# Patient Record
Sex: FEMALE | Race: BLACK OR AFRICAN AMERICAN | NOT HISPANIC OR LATINO | Employment: OTHER | URBAN - METROPOLITAN AREA
[De-identification: names, ages, dates, MRNs, and addresses within clinical notes are randomized per-mention and may not be internally consistent; named-entity substitution may affect disease eponyms.]

---

## 2018-04-19 ENCOUNTER — OFFICE VISIT (OUTPATIENT)
Dept: CARDIOLOGY | Facility: CLINIC | Age: 58
End: 2018-04-19
Payer: MEDICARE

## 2018-04-19 VITALS
WEIGHT: 293 LBS | DIASTOLIC BLOOD PRESSURE: 92 MMHG | OXYGEN SATURATION: 97 % | HEART RATE: 92 BPM | SYSTOLIC BLOOD PRESSURE: 152 MMHG | HEIGHT: 65 IN | BODY MASS INDEX: 48.82 KG/M2

## 2018-04-19 DIAGNOSIS — E66.9 DIABETES MELLITUS TYPE 2 IN OBESE: ICD-10-CM

## 2018-04-19 DIAGNOSIS — I10 ESSENTIAL HYPERTENSION: ICD-10-CM

## 2018-04-19 DIAGNOSIS — I63.40 CEREBROVASCULAR ACCIDENT (CVA) DUE TO EMBOLISM OF CEREBRAL ARTERY: Primary | ICD-10-CM

## 2018-04-19 DIAGNOSIS — E11.69 DIABETES MELLITUS TYPE 2 IN OBESE: ICD-10-CM

## 2018-04-19 DIAGNOSIS — I48.0 PAROXYSMAL ATRIAL FIBRILLATION: ICD-10-CM

## 2018-04-19 DIAGNOSIS — Z91.89 AT HIGH RISK FOR GU BLEEDING: ICD-10-CM

## 2018-04-19 PROBLEM — E78.00 PURE HYPERCHOLESTEROLEMIA: Status: ACTIVE | Noted: 2017-09-26

## 2018-04-19 PROBLEM — I63.9 CEREBROVASCULAR ACCIDENT (CVA) DUE TO EMBOLISM: Status: ACTIVE | Noted: 2017-09-26

## 2018-04-19 PROCEDURE — 99205 OFFICE O/P NEW HI 60 MIN: CPT | Mod: S$GLB,,, | Performed by: INTERNAL MEDICINE

## 2018-04-19 PROCEDURE — 93000 ELECTROCARDIOGRAM COMPLETE: CPT | Mod: S$GLB,,, | Performed by: NUCLEAR MEDICINE

## 2018-04-19 PROCEDURE — 3080F DIAST BP >= 90 MM HG: CPT | Mod: CPTII,S$GLB,, | Performed by: INTERNAL MEDICINE

## 2018-04-19 PROCEDURE — 3077F SYST BP >= 140 MM HG: CPT | Mod: CPTII,S$GLB,, | Performed by: INTERNAL MEDICINE

## 2018-04-19 PROCEDURE — 99999 PR PBB SHADOW E&M-EST. PATIENT-LVL III: CPT | Mod: PBBFAC,,, | Performed by: INTERNAL MEDICINE

## 2018-04-19 RX ORDER — METOPROLOL TARTRATE 50 MG/1
50 TABLET ORAL 2 TIMES DAILY
Status: ON HOLD | COMMUNITY
End: 2018-06-11

## 2018-04-19 RX ORDER — INSULIN GLARGINE 100 [IU]/ML
48 INJECTION, SOLUTION SUBCUTANEOUS NIGHTLY
COMMUNITY

## 2018-04-19 RX ORDER — METFORMIN HYDROCHLORIDE 500 MG/1
500 TABLET ORAL 2 TIMES DAILY
COMMUNITY
Start: 2017-09-26

## 2018-04-19 RX ORDER — PRAVASTATIN SODIUM 40 MG/1
40 TABLET ORAL DAILY
Status: ON HOLD | COMMUNITY
End: 2018-06-11 | Stop reason: HOSPADM

## 2018-04-19 RX ORDER — HYDRALAZINE HYDROCHLORIDE 25 MG/1
50 TABLET, FILM COATED ORAL 2 TIMES DAILY
Status: ON HOLD | COMMUNITY
End: 2018-06-11 | Stop reason: HOSPADM

## 2018-04-19 RX ORDER — LUBIPROSTONE 8 UG/1
8 CAPSULE ORAL 2 TIMES DAILY PRN
COMMUNITY

## 2018-04-19 RX ORDER — PANTOPRAZOLE SODIUM 40 MG/1
40 TABLET, DELAYED RELEASE ORAL DAILY
COMMUNITY

## 2018-04-19 RX ORDER — IBUPROFEN 600 MG/1
600 TABLET ORAL EVERY 6 HOURS PRN
COMMUNITY

## 2018-04-19 RX ORDER — SPIRONOLACTONE 50 MG/1
50 TABLET, FILM COATED ORAL 2 TIMES DAILY
COMMUNITY

## 2018-04-19 NOTE — PROGRESS NOTES
Subjective:    Patient ID:  Sara Grey is a 57 y.o. female who presents for evaluation of Atrial Fibrillation      57 yoF HTN, DM, AF, CVA, COPD, tob abuse here for LAAO consideration. She has history of AF dating back several years. She was placed on pradaxa for CVA prophylaxis. She suffered a CVA 6/17 on pradaxa leading to some speech impairment that is mostly improved. She was switched to eliquis but has had recurrent  bleeding. Her EF is normal at 55% but she has diastolic dynsfunction. She has moderate COPD, LUCRECIA and morbid obesity. No history of PVD.    EF 55% 6/25/17    Past Medical History:  No date: Atrial fibrillation  No date: COPD (chronic obstructive pulmonary disease)  No date: CVA (cerebral vascular accident)  No date: Hypertension    No past surgical history on file.    Social History    Marital status: Single              Spouse name:                       Years of education:                 Number of children:               Occupational History    None on file    Social History Main Topics    Smoking status: Current Every Day Smoker                                                     Packs/day: 0.00      Years: 0.00           Smokeless tobacco: Not on file                       Alcohol use: Not on file     Drug use: Not on file     Sexual activity: Not on file          Other Topics            Concern    None on file    Social History Narrative    None on file    No family history on file.          Review of Systems   Constitution: Negative.   HENT: Negative.    Eyes: Negative.    Cardiovascular: Positive for dyspnea on exertion. Negative for chest pain, leg swelling, near-syncope, palpitations and syncope.   Respiratory: Positive for shortness of breath.    Endocrine: Negative.    Hematologic/Lymphatic: Negative.    Skin: Negative.    Musculoskeletal: Negative.    Gastrointestinal: Positive for bloating.   Genitourinary: Positive for non-menstrual bleeding.   Neurological: Negative.  Negative  for dizziness and light-headedness.   Psychiatric/Behavioral: Negative.    Allergic/Immunologic: Negative.         Objective:    Physical Exam   Constitutional: She is oriented to person, place, and time. She appears well-developed and well-nourished. No distress.   Morbidly obese   HENT:   Head: Normocephalic and atraumatic.   Eyes: EOM are normal. Pupils are equal, round, and reactive to light.   Neck: Normal range of motion. No JVD present. No thyromegaly present.   Cardiovascular: Normal rate, S1 normal, S2 normal and normal heart sounds.  An irregularly irregular rhythm present. PMI is not displaced.  Exam reveals no gallop and no friction rub.    No murmur heard.  Pulmonary/Chest: Effort normal and breath sounds normal. No respiratory distress. She has no wheezes. She has no rales.   Abdominal: Soft. Bowel sounds are normal. She exhibits mass (ventral hernia). She exhibits no distension. There is no tenderness. There is no rebound and no guarding.   Musculoskeletal: Normal range of motion. She exhibits no edema or tenderness.   Neurological: She is alert and oriented to person, place, and time. No cranial nerve deficit.   Skin: Skin is warm and dry. No rash noted. No erythema.   Psychiatric: She has a normal mood and affect. Her behavior is normal. Judgment and thought content normal.   Vitals reviewed.    ECG: AF with variable V rate, nl QRS        Assessment:       1. Cerebrovascular accident (CVA) due to embolism of cerebral artery    2. Paroxysmal atrial fibrillation    3. At high risk for  bleeding    4. Essential hypertension    5. Diabetes mellitus type 2 in obese         Plan:       57 yoF paroxysmal (possibly persistent) AF, HTN, DM, CVA here for LAAO consideration. She has had a CVA while on on pradaxa and has had  bleeding on eliquis. She has a CHADSVASC score of 5. She can tolerate short term OAC but is not a candidate for long term OAC due to recurrent bleeding issues. I discussed management  options regarding LAAO. I discussed the process of LAAO via Watchman including pre-procedure testing- SADIE & CT- as well as the need to take warfarin for 6 weeks post implant. We discussed post procedure SADIE studies to assess XOCHILT occlusion. Additionally I mentioned the need to take DAPT up to the 6 month point post implant.     Pre-procedure Watchman studies: SADIE & CT for XOCHILT geometry  Watchman with Anesthesia- Choice, may need airway due to obesity/COPD

## 2018-04-25 ENCOUNTER — CLINICAL SUPPORT (OUTPATIENT)
Dept: CARDIOLOGY | Facility: CLINIC | Age: 58
End: 2018-04-25
Attending: INTERNAL MEDICINE
Payer: MEDICARE

## 2018-04-25 ENCOUNTER — LAB VISIT (OUTPATIENT)
Dept: LAB | Facility: HOSPITAL | Age: 58
End: 2018-04-25
Attending: INTERNAL MEDICINE
Payer: MEDICARE

## 2018-04-25 ENCOUNTER — DOCUMENTATION ONLY (OUTPATIENT)
Dept: CARDIOLOGY | Facility: CLINIC | Age: 58
End: 2018-04-25

## 2018-04-25 DIAGNOSIS — I48.0 PAROXYSMAL ATRIAL FIBRILLATION: ICD-10-CM

## 2018-04-25 LAB
ALBUMIN SERPL BCP-MCNC: 3.2 G/DL
ALP SERPL-CCNC: 105 U/L
ALT SERPL W/O P-5'-P-CCNC: 28 U/L
ANION GAP SERPL CALC-SCNC: 8 MMOL/L
AST SERPL-CCNC: 22 U/L
BASOPHILS # BLD AUTO: 0.04 K/UL
BASOPHILS NFR BLD: 0.4 %
BILIRUB SERPL-MCNC: 0.6 MG/DL
BUN SERPL-MCNC: 20 MG/DL
CALCIUM SERPL-MCNC: 9 MG/DL
CHLORIDE SERPL-SCNC: 112 MMOL/L
CO2 SERPL-SCNC: 23 MMOL/L
CREAT SERPL-MCNC: 1.2 MG/DL
DIASTOLIC DYSFUNCTION: NO
DIFFERENTIAL METHOD: ABNORMAL
EOSINOPHIL # BLD AUTO: 0.2 K/UL
EOSINOPHIL NFR BLD: 1.7 %
ERYTHROCYTE [DISTWIDTH] IN BLOOD BY AUTOMATED COUNT: 15.9 %
EST. GFR  (AFRICAN AMERICAN): 58.8 ML/MIN/1.73 M^2
EST. GFR  (NON AFRICAN AMERICAN): 51 ML/MIN/1.73 M^2
ESTIMATED PA SYSTOLIC PRESSURE: 19.89
GLUCOSE SERPL-MCNC: 127 MG/DL
HCT VFR BLD AUTO: 40.5 %
HGB BLD-MCNC: 12.3 G/DL
IMM GRANULOCYTES # BLD AUTO: 0.04 K/UL
IMM GRANULOCYTES NFR BLD AUTO: 0.4 %
LYMPHOCYTES # BLD AUTO: 1.3 K/UL
LYMPHOCYTES NFR BLD: 12.3 %
MCH RBC QN AUTO: 28.5 PG
MCHC RBC AUTO-ENTMCNC: 30.4 G/DL
MCV RBC AUTO: 94 FL
MONOCYTES # BLD AUTO: 0.6 K/UL
MONOCYTES NFR BLD: 5.6 %
NEUTROPHILS # BLD AUTO: 8.2 K/UL
NEUTROPHILS NFR BLD: 79.6 %
NRBC BLD-RTO: 0 /100 WBC
PLATELET # BLD AUTO: 313 K/UL
PMV BLD AUTO: 10.9 FL
POTASSIUM SERPL-SCNC: 4.4 MMOL/L
PROT SERPL-MCNC: 7.2 G/DL
RBC # BLD AUTO: 4.32 M/UL
RETIRED EF AND QEF - SEE NOTES: 55 (ref 55–65)
SODIUM SERPL-SCNC: 143 MMOL/L
WBC # BLD AUTO: 10.33 K/UL

## 2018-04-25 PROCEDURE — 80053 COMPREHEN METABOLIC PANEL: CPT

## 2018-04-25 PROCEDURE — 93306 TTE W/DOPPLER COMPLETE: CPT | Mod: S$GLB,,, | Performed by: INTERNAL MEDICINE

## 2018-04-25 PROCEDURE — 85025 COMPLETE CBC W/AUTO DIFF WBC: CPT

## 2018-04-25 PROCEDURE — 36415 COLL VENOUS BLD VENIPUNCTURE: CPT

## 2018-04-25 NOTE — PROGRESS NOTES
Patient presented for an echocardiogram today.  This study was performed in conjunction with Optison contrast agent because of poor endocardial visualization.  Procedure was explained to the patient, she verbalized understanding and signed the consent.  IV started in the right AC using aseptic technique by Rhonda Rosales LPN x 1 attempt. Optison lot # [id number] (expiration date 08/21/2019) was used.  Patient tolerated the procedure well.

## 2018-05-11 ENCOUNTER — TELEPHONE (OUTPATIENT)
Dept: ELECTROPHYSIOLOGY | Facility: CLINIC | Age: 58
End: 2018-05-11

## 2018-05-11 DIAGNOSIS — I48.0 PAROXYSMAL ATRIAL FIBRILLATION: Primary | ICD-10-CM

## 2018-05-11 NOTE — TELEPHONE ENCOUNTER
CT SCAN & TRANSESOPHAGEAL ECHO (SADIE) INSTRUCTIONS    6/8/18 @ 9 AM -- CT SCAN of chest   [facility] (across the street from hospital)  [address], [place], [place]    · Arrive 30 min prior to procedure.    6/8/18 at 11 AM  Report to the Cardiology Waiting Area on the 3rd floor of the hospital at (Do not report to clinic)  If you park in the Parking Garage:  Take elevators to the 2nd floor  Walk up ramp and turn right by Gold Elevators  Take elevator to the 3rd floor  Upon exiting the elevator, turn away from the clinic areas  Walk long ibanez around to front of hospital to area with windows overlooking [facility]  Check in at Reception Desk  OR  If family is dropping you off:  Have them drop you off at the front of the Hospital  (Near the ER, where all the flags are hung).  Take the E elevators to the 3rd floor.  Check in at the Reception Desk in the waiting room.    Do not eat or drink anything after 12 mn the night before your procedure    Medications:  Stop your Metformin 1 day before your CT and SADIE  Take half your normal dose of your Lantus on the evening before your procedure at bedtime  Hold your Metformin and any insulin on the morning of your procedure  Do not take your Metformin for 2 days after your CT scan  You may take your usual morning medications with a sip of water    A nurse from the Echo Dept will call you to ask you some questions before your procedure.    You will need someone to drive you home after your test.     Any need to reschedule or cancel procedures, or any questions regarding your procedures should be addressed directly with the Arrhythmia Department Nurses at the following phone number: [phone]

## 2018-06-08 ENCOUNTER — HOSPITAL ENCOUNTER (OUTPATIENT)
Dept: RADIOLOGY | Facility: HOSPITAL | Age: 58
Discharge: HOME | DRG: 308 | End: 2018-06-08
Attending: INTERNAL MEDICINE
Payer: MEDICARE

## 2018-06-08 ENCOUNTER — HOSPITAL ENCOUNTER (INPATIENT)
Facility: HOSPITAL | Age: 58
LOS: 3 days | Discharge: HOME HEALTH | DRG: 308 | End: 2018-06-11
Attending: INTERNAL MEDICINE | Admitting: INTERNAL MEDICINE
Payer: MEDICARE

## 2018-06-08 ENCOUNTER — HOSPITAL ENCOUNTER (OUTPATIENT)
Dept: CARDIOLOGY | Facility: CLINIC | Age: 58
Discharge: HOME | DRG: 308 | End: 2018-06-08
Attending: INTERNAL MEDICINE | Admitting: INTERNAL MEDICINE
Payer: MEDICARE

## 2018-06-08 ENCOUNTER — ANESTHESIA EVENT (OUTPATIENT)
Dept: MEDSURG UNIT | Facility: HOSPITAL | Age: 58
DRG: 308 | End: 2018-06-08
Payer: MEDICARE

## 2018-06-08 ENCOUNTER — ANESTHESIA (OUTPATIENT)
Dept: MEDSURG UNIT | Facility: HOSPITAL | Age: 58
DRG: 308 | End: 2018-06-08
Payer: MEDICARE

## 2018-06-08 ENCOUNTER — SURGERY (OUTPATIENT)
Age: 58
End: 2018-06-08

## 2018-06-08 DIAGNOSIS — I48.0 PAROXYSMAL ATRIAL FIBRILLATION: ICD-10-CM

## 2018-06-08 DIAGNOSIS — Z91.89 AT HIGH RISK FOR GU BLEEDING: ICD-10-CM

## 2018-06-08 DIAGNOSIS — E11.69 DIABETES MELLITUS TYPE 2 IN OBESE: ICD-10-CM

## 2018-06-08 DIAGNOSIS — I50.33 ACUTE ON CHRONIC DIASTOLIC (CONGESTIVE) HEART FAILURE: ICD-10-CM

## 2018-06-08 DIAGNOSIS — I63.9 CEREBROVASCULAR ACCIDENT (CVA) DUE TO EMBOLISM: ICD-10-CM

## 2018-06-08 DIAGNOSIS — I48.91 AF (ATRIAL FIBRILLATION): ICD-10-CM

## 2018-06-08 DIAGNOSIS — I48.19 PERSISTENT ATRIAL FIBRILLATION: ICD-10-CM

## 2018-06-08 DIAGNOSIS — J44.9 CHRONIC OBSTRUCTIVE PULMONARY DISEASE, UNSPECIFIED COPD TYPE: ICD-10-CM

## 2018-06-08 DIAGNOSIS — I48.91 ATRIAL FIBRILLATION WITH RAPID VENTRICULAR RESPONSE: ICD-10-CM

## 2018-06-08 DIAGNOSIS — R06.83 SNORING: ICD-10-CM

## 2018-06-08 DIAGNOSIS — I48.0 PAROXYSMAL ATRIAL FIBRILLATION: Primary | ICD-10-CM

## 2018-06-08 DIAGNOSIS — I10 ESSENTIAL HYPERTENSION: ICD-10-CM

## 2018-06-08 DIAGNOSIS — E78.00 PURE HYPERCHOLESTEROLEMIA: ICD-10-CM

## 2018-06-08 DIAGNOSIS — E66.9 DIABETES MELLITUS TYPE 2 IN OBESE: ICD-10-CM

## 2018-06-08 DIAGNOSIS — R09.02 HYPOXEMIA: ICD-10-CM

## 2018-06-08 DIAGNOSIS — J44.9 COPD (CHRONIC OBSTRUCTIVE PULMONARY DISEASE): ICD-10-CM

## 2018-06-08 DIAGNOSIS — I63.40 CEREBROVASCULAR ACCIDENT (CVA) DUE TO EMBOLISM OF CEREBRAL ARTERY: ICD-10-CM

## 2018-06-08 LAB
ALBUMIN SERPL BCP-MCNC: 3.2 G/DL
ALP SERPL-CCNC: 99 U/L
ALT SERPL W/O P-5'-P-CCNC: 12 U/L
ANION GAP SERPL CALC-SCNC: 12 MMOL/L
AST SERPL-CCNC: 11 U/L
BILIRUB SERPL-MCNC: 0.8 MG/DL
BNP SERPL-MCNC: 331 PG/ML
BUN SERPL-MCNC: 16 MG/DL
CALCIUM SERPL-MCNC: 8.8 MG/DL
CHLORIDE SERPL-SCNC: 107 MMOL/L
CO2 SERPL-SCNC: 25 MMOL/L
CREAT SERPL-MCNC: 1 MG/DL (ref 0.5–1.4)
CREAT SERPL-MCNC: 1.1 MG/DL
ERYTHROCYTE [DISTWIDTH] IN BLOOD BY AUTOMATED COUNT: 15.3 %
EST. GFR  (AFRICAN AMERICAN): >60 ML/MIN/1.73 M^2
EST. GFR  (NON AFRICAN AMERICAN): 56.7 ML/MIN/1.73 M^2
ESTIMATED AVG GLUCOSE: 186 MG/DL
GLUCOSE SERPL-MCNC: 145 MG/DL
HBA1C MFR BLD HPLC: 8.1 %
HCT VFR BLD AUTO: 41.8 %
HGB BLD-MCNC: 13.1 G/DL
INR PPP: 1.1
MAGNESIUM SERPL-MCNC: 1.9 MG/DL
MCH RBC QN AUTO: 27.3 PG
MCHC RBC AUTO-ENTMCNC: 31.3 G/DL
MCV RBC AUTO: 87 FL
PLATELET # BLD AUTO: 246 K/UL
PMV BLD AUTO: 11.9 FL
POCT GLUCOSE: 205 MG/DL (ref 70–110)
POTASSIUM SERPL-SCNC: 3.8 MMOL/L
PROT SERPL-MCNC: 6.8 G/DL
PROTHROMBIN TIME: 11.2 SEC
RBC # BLD AUTO: 4.79 M/UL
SAMPLE: NORMAL
SODIUM SERPL-SCNC: 144 MMOL/L
WBC # BLD AUTO: 9.92 K/UL

## 2018-06-08 PROCEDURE — 25000003 PHARM REV CODE 250: Performed by: STUDENT IN AN ORGANIZED HEALTH CARE EDUCATION/TRAINING PROGRAM

## 2018-06-08 PROCEDURE — 93010 ELECTROCARDIOGRAM REPORT: CPT | Mod: ,,, | Performed by: INTERNAL MEDICINE

## 2018-06-08 PROCEDURE — 25000003 PHARM REV CODE 250: Performed by: INTERNAL MEDICINE

## 2018-06-08 PROCEDURE — 85027 COMPLETE CBC AUTOMATED: CPT

## 2018-06-08 PROCEDURE — 99222 1ST HOSP IP/OBS MODERATE 55: CPT | Mod: AI,GC,, | Performed by: INTERNAL MEDICINE

## 2018-06-08 PROCEDURE — 27000190 HC CPAP FULL FACE MASK W/VALVE

## 2018-06-08 PROCEDURE — 93005 ELECTROCARDIOGRAM TRACING: CPT

## 2018-06-08 PROCEDURE — 63600175 PHARM REV CODE 636 W HCPCS: Performed by: NURSE ANESTHETIST, CERTIFIED REGISTERED

## 2018-06-08 PROCEDURE — 80053 COMPREHEN METABOLIC PANEL: CPT

## 2018-06-08 PROCEDURE — 25000003 PHARM REV CODE 250: Performed by: NURSE ANESTHETIST, CERTIFIED REGISTERED

## 2018-06-08 PROCEDURE — D9220A PRA ANESTHESIA: Mod: ANES,,, | Performed by: ANESTHESIOLOGY

## 2018-06-08 PROCEDURE — 83036 HEMOGLOBIN GLYCOSYLATED A1C: CPT

## 2018-06-08 PROCEDURE — B246ZZ4 ULTRASONOGRAPHY OF RIGHT AND LEFT HEART, TRANSESOPHAGEAL: ICD-10-PCS | Performed by: ANESTHESIOLOGY

## 2018-06-08 PROCEDURE — 36415 COLL VENOUS BLD VENIPUNCTURE: CPT

## 2018-06-08 PROCEDURE — D9220A PRA ANESTHESIA: Mod: CRNA,,, | Performed by: NURSE ANESTHETIST, CERTIFIED REGISTERED

## 2018-06-08 PROCEDURE — 85610 PROTHROMBIN TIME: CPT

## 2018-06-08 PROCEDURE — 83735 ASSAY OF MAGNESIUM: CPT

## 2018-06-08 PROCEDURE — 25000242 PHARM REV CODE 250 ALT 637 W/ HCPCS: Performed by: NURSE ANESTHETIST, CERTIFIED REGISTERED

## 2018-06-08 PROCEDURE — 27000221 HC OXYGEN, UP TO 24 HOURS

## 2018-06-08 PROCEDURE — 37000009 HC ANESTHESIA EA ADD 15 MINS

## 2018-06-08 PROCEDURE — 63600175 PHARM REV CODE 636 W HCPCS: Performed by: INTERNAL MEDICINE

## 2018-06-08 PROCEDURE — 94761 N-INVAS EAR/PLS OXIMETRY MLT: CPT

## 2018-06-08 PROCEDURE — 20000000 HC ICU ROOM

## 2018-06-08 PROCEDURE — 94660 CPAP INITIATION&MGMT: CPT

## 2018-06-08 PROCEDURE — 37000008 HC ANESTHESIA 1ST 15 MINUTES

## 2018-06-08 PROCEDURE — 99900035 HC TECH TIME PER 15 MIN (STAT)

## 2018-06-08 PROCEDURE — 83880 ASSAY OF NATRIURETIC PEPTIDE: CPT

## 2018-06-08 RX ORDER — SODIUM CHLORIDE 0.9 % (FLUSH) 0.9 %
5 SYRINGE (ML) INJECTION
Status: DISCONTINUED | OUTPATIENT
Start: 2018-06-08 | End: 2018-06-11 | Stop reason: HOSPADM

## 2018-06-08 RX ORDER — ATORVASTATIN CALCIUM 20 MG/1
80 TABLET, FILM COATED ORAL DAILY
Status: DISCONTINUED | OUTPATIENT
Start: 2018-06-09 | End: 2018-06-08

## 2018-06-08 RX ORDER — MIDAZOLAM HYDROCHLORIDE 1 MG/ML
INJECTION, SOLUTION INTRAMUSCULAR; INTRAVENOUS
Status: DISCONTINUED | OUTPATIENT
Start: 2018-06-08 | End: 2018-06-08

## 2018-06-08 RX ORDER — METOPROLOL TARTRATE 50 MG/1
50 TABLET ORAL 2 TIMES DAILY
Status: DISCONTINUED | OUTPATIENT
Start: 2018-06-08 | End: 2018-06-08

## 2018-06-08 RX ORDER — DILTIAZEM HYDROCHLORIDE 30 MG/1
30 TABLET, FILM COATED ORAL EVERY 6 HOURS
Status: DISCONTINUED | OUTPATIENT
Start: 2018-06-08 | End: 2018-06-10

## 2018-06-08 RX ORDER — LISINOPRIL 2.5 MG/1
2.5 TABLET ORAL DAILY
COMMUNITY
End: 2018-08-16 | Stop reason: SDUPTHER

## 2018-06-08 RX ORDER — LANOLIN ALCOHOL/MO/W.PET/CERES
400 CREAM (GRAM) TOPICAL ONCE
Status: COMPLETED | OUTPATIENT
Start: 2018-06-08 | End: 2018-06-08

## 2018-06-08 RX ORDER — ALBUTEROL SULFATE 90 UG/1
AEROSOL, METERED RESPIRATORY (INHALATION)
Status: DISCONTINUED | OUTPATIENT
Start: 2018-06-08 | End: 2018-06-08

## 2018-06-08 RX ORDER — LANOLIN ALCOHOL/MO/W.PET/CERES
800 CREAM (GRAM) TOPICAL
Status: DISCONTINUED | OUTPATIENT
Start: 2018-06-08 | End: 2018-06-11 | Stop reason: HOSPADM

## 2018-06-08 RX ORDER — KETAMINE HYDROCHLORIDE 10 MG/ML
INJECTION, SOLUTION INTRAMUSCULAR; INTRAVENOUS
Status: DISCONTINUED | OUTPATIENT
Start: 2018-06-08 | End: 2018-06-08

## 2018-06-08 RX ORDER — GLYCOPYRROLATE 0.2 MG/ML
INJECTION INTRAMUSCULAR; INTRAVENOUS
Status: DISCONTINUED | OUTPATIENT
Start: 2018-06-08 | End: 2018-06-08

## 2018-06-08 RX ORDER — DILTIAZEM HYDROCHLORIDE 5 MG/ML
INJECTION INTRAVENOUS
Status: DISCONTINUED | OUTPATIENT
Start: 2018-06-08 | End: 2018-06-08

## 2018-06-08 RX ORDER — SODIUM CHLORIDE 9 MG/ML
INJECTION, SOLUTION INTRAVENOUS ONCE
Status: COMPLETED | OUTPATIENT
Start: 2018-06-08 | End: 2018-06-08

## 2018-06-08 RX ORDER — ATORVASTATIN CALCIUM 80 MG/1
80 TABLET, FILM COATED ORAL DAILY
Status: ON HOLD | COMMUNITY
End: 2018-06-11 | Stop reason: HOSPADM

## 2018-06-08 RX ORDER — SODIUM,POTASSIUM PHOSPHATES 280-250MG
2 POWDER IN PACKET (EA) ORAL
Status: DISCONTINUED | OUTPATIENT
Start: 2018-06-08 | End: 2018-06-11 | Stop reason: HOSPADM

## 2018-06-08 RX ORDER — SODIUM CHLORIDE 0.9 % (FLUSH) 0.9 %
3 SYRINGE (ML) INJECTION EVERY 8 HOURS
Status: DISCONTINUED | OUTPATIENT
Start: 2018-06-08 | End: 2018-06-11 | Stop reason: HOSPADM

## 2018-06-08 RX ORDER — POTASSIUM CHLORIDE 20 MEQ/15ML
60 SOLUTION ORAL
Status: DISCONTINUED | OUTPATIENT
Start: 2018-06-08 | End: 2018-06-11 | Stop reason: HOSPADM

## 2018-06-08 RX ORDER — PRAVASTATIN SODIUM 40 MG/1
40 TABLET ORAL DAILY
Status: DISCONTINUED | OUTPATIENT
Start: 2018-06-09 | End: 2018-06-11 | Stop reason: HOSPADM

## 2018-06-08 RX ORDER — GLUCAGON 1 MG
1 KIT INJECTION
Status: DISCONTINUED | OUTPATIENT
Start: 2018-06-08 | End: 2018-06-11 | Stop reason: HOSPADM

## 2018-06-08 RX ORDER — METOPROLOL TARTRATE 50 MG/1
50 TABLET ORAL 3 TIMES DAILY
Status: DISCONTINUED | OUTPATIENT
Start: 2018-06-08 | End: 2018-06-08

## 2018-06-08 RX ORDER — DILTIAZEM HCL 1 MG/ML
5 INJECTION, SOLUTION INTRAVENOUS CONTINUOUS
Status: DISCONTINUED | OUTPATIENT
Start: 2018-06-08 | End: 2018-06-09

## 2018-06-08 RX ORDER — PANTOPRAZOLE SODIUM 40 MG/1
40 TABLET, DELAYED RELEASE ORAL DAILY
Status: DISCONTINUED | OUTPATIENT
Start: 2018-06-09 | End: 2018-06-11 | Stop reason: HOSPADM

## 2018-06-08 RX ORDER — POTASSIUM CHLORIDE 20 MEQ/15ML
40 SOLUTION ORAL
Status: DISCONTINUED | OUTPATIENT
Start: 2018-06-08 | End: 2018-06-11 | Stop reason: HOSPADM

## 2018-06-08 RX ORDER — LISINOPRIL 2.5 MG/1
2.5 TABLET ORAL DAILY
Status: DISCONTINUED | OUTPATIENT
Start: 2018-06-09 | End: 2018-06-11 | Stop reason: HOSPADM

## 2018-06-08 RX ORDER — POTASSIUM CHLORIDE 20 MEQ/1
40 TABLET, EXTENDED RELEASE ORAL ONCE
Status: COMPLETED | OUTPATIENT
Start: 2018-06-08 | End: 2018-06-08

## 2018-06-08 RX ORDER — FUROSEMIDE 40 MG/1
40 TABLET ORAL 2 TIMES DAILY
COMMUNITY

## 2018-06-08 RX ORDER — DILTIAZEM HYDROCHLORIDE 30 MG/1
30 TABLET, FILM COATED ORAL EVERY 6 HOURS
Status: DISCONTINUED | OUTPATIENT
Start: 2018-06-08 | End: 2018-06-08

## 2018-06-08 RX ORDER — HYDRALAZINE HYDROCHLORIDE 50 MG/1
50 TABLET, FILM COATED ORAL 2 TIMES DAILY
Status: DISCONTINUED | OUTPATIENT
Start: 2018-06-08 | End: 2018-06-11 | Stop reason: HOSPADM

## 2018-06-08 RX ORDER — ISOSORBIDE MONONITRATE 30 MG/1
30 TABLET, EXTENDED RELEASE ORAL DAILY
Status: DISCONTINUED | OUTPATIENT
Start: 2018-06-09 | End: 2018-06-11 | Stop reason: HOSPADM

## 2018-06-08 RX ORDER — INSULIN ASPART 100 [IU]/ML
0-5 INJECTION, SOLUTION INTRAVENOUS; SUBCUTANEOUS EVERY 6 HOURS PRN
Status: DISCONTINUED | OUTPATIENT
Start: 2018-06-08 | End: 2018-06-10

## 2018-06-08 RX ORDER — DILTIAZEM HCL 1 MG/ML
5 INJECTION, SOLUTION INTRAVENOUS CONTINUOUS
Status: DISCONTINUED | OUTPATIENT
Start: 2018-06-08 | End: 2018-06-08

## 2018-06-08 RX ORDER — FUROSEMIDE 10 MG/ML
60 INJECTION INTRAMUSCULAR; INTRAVENOUS ONCE
Status: COMPLETED | OUTPATIENT
Start: 2018-06-08 | End: 2018-06-08

## 2018-06-08 RX ADMIN — APIXABAN 5 MG: 2.5 TABLET, FILM COATED ORAL at 08:06

## 2018-06-08 RX ADMIN — DILTIAZEM HYDROCHLORIDE 10000 MCG: 5 INJECTION, SOLUTION INTRAVENOUS at 02:06

## 2018-06-08 RX ADMIN — DILTIAZEM HYDROCHLORIDE 5 MG/HR: 5 INJECTION INTRAVENOUS at 02:06

## 2018-06-08 RX ADMIN — KETAMINE HYDROCHLORIDE 20 MG: 10 INJECTION, SOLUTION INTRAMUSCULAR; INTRAVENOUS at 01:06

## 2018-06-08 RX ADMIN — HYDRALAZINE HYDROCHLORIDE 50 MG: 50 TABLET ORAL at 08:06

## 2018-06-08 RX ADMIN — INSULIN ASPART 1 UNITS: 100 INJECTION, SOLUTION INTRAVENOUS; SUBCUTANEOUS at 09:06

## 2018-06-08 RX ADMIN — POTASSIUM CHLORIDE 40 MEQ: 1500 TABLET, EXTENDED RELEASE ORAL at 05:06

## 2018-06-08 RX ADMIN — MIDAZOLAM 1 MG: 1 INJECTION INTRAMUSCULAR; INTRAVENOUS at 01:06

## 2018-06-08 RX ADMIN — Medication 400 MG: at 05:06

## 2018-06-08 RX ADMIN — DILTIAZEM HYDROCHLORIDE 5 MG/HR: 5 INJECTION INTRAVENOUS at 08:06

## 2018-06-08 RX ADMIN — ALBUTEROL SULFATE 3 PUFF: 90 AEROSOL, METERED RESPIRATORY (INHALATION) at 01:06

## 2018-06-08 RX ADMIN — DILTIAZEM HYDROCHLORIDE 30 MG: 30 TABLET, FILM COATED ORAL at 11:06

## 2018-06-08 RX ADMIN — FUROSEMIDE 60 MG: 10 INJECTION, SOLUTION INTRAMUSCULAR; INTRAVENOUS at 05:06

## 2018-06-08 RX ADMIN — GLYCOPYRROLATE 0.2 MG: 0.2 INJECTION, SOLUTION INTRAMUSCULAR; INTRAVENOUS at 01:06

## 2018-06-08 RX ADMIN — SODIUM CHLORIDE: 0.9 INJECTION, SOLUTION INTRAVENOUS at 01:06

## 2018-06-08 RX ADMIN — DILTIAZEM HYDROCHLORIDE 30 MG: 30 TABLET, FILM COATED ORAL at 05:06

## 2018-06-08 RX ADMIN — ALBUTEROL SULFATE 2 PUFF: 90 AEROSOL, METERED RESPIRATORY (INHALATION) at 01:06

## 2018-06-08 NOTE — SUBJECTIVE & OBJECTIVE
Review of Systems   Constitution: Negative for chills and fever.   HENT: Negative for ear discharge.    Eyes: Negative for pain and visual disturbance.   Cardiovascular: Positive for chest pain and irregular heartbeat. Negative for dyspnea on exertion, leg swelling, orthopnea, palpitations, paroxysmal nocturnal dyspnea and syncope.   Respiratory: Positive for shortness of breath. Negative for hemoptysis and wheezing.    Skin: Negative for rash and suspicious lesions.   Musculoskeletal: Negative for joint pain and muscle weakness.   Gastrointestinal: Negative for abdominal pain, diarrhea, hematemesis, hematochezia, melena, nausea and vomiting.   Genitourinary: Negative for dysuria and frequency.   Neurological: Negative for focal weakness, headaches and tremors.   Psychiatric/Behavioral: Negative for altered mental status, suicidal ideas and thoughts of violence.     Objective:     Vital Signs (Most Recent):  Temp: 97.2 °F (36.2 °C) (06/08/18 1220)  Pulse: 102 (06/08/18 1220)  Resp: 18 (06/08/18 1220)  BP: 136/87 (06/08/18 1222)  SpO2: (!) 94 % (06/08/18 1220) Vital Signs (24h Range):  Temp:  [97.2 °F (36.2 °C)] 97.2 °F (36.2 °C)  Pulse:  [102] 102  Resp:  [18] 18  SpO2:  [94 %] 94 %  BP: (136-141)/(87-98) 136/87     Weight: (!) 158.8 kg (350 lb)  Body mass index is 62 kg/m².     SpO2: (!) 94 %  O2 Device (Oxygen Therapy): room air      Intake/Output Summary (Last 24 hours) at 06/08/18 1527  Last data filed at 06/08/18 1500   Gross per 24 hour   Intake              100 ml   Output                0 ml   Net              100 ml       Lines/Drains/Airways     Peripheral Intravenous Line                 Peripheral IV - Single Lumen 06/08/18 0834 Right Antecubital less than 1 day                Physical Exam   Constitutional: She is oriented to person, place, and time. She appears well-developed and well-nourished.   HENT:   Head: Normocephalic and atraumatic.   Eyes: EOM are normal.   Cardiovascular: An irregularly  irregular rhythm present. Tachycardia present.  Exam reveals no gallop and no friction rub.    2+ b/l LE edema   Pulmonary/Chest: Effort normal and breath sounds normal. No stridor. She has no wheezes. She has no rales.   Abdominal: Soft. Bowel sounds are normal. There is no rebound and no guarding.   Musculoskeletal: She exhibits no edema.   Neurological: She is alert and oriented to person, place, and time. No cranial nerve deficit.   Skin: Skin is warm and dry.   Psychiatric: She has a normal mood and affect. Her behavior is normal.       Significant Labs: All pertinent lab results from the last 24 hours have been reviewed.

## 2018-06-08 NOTE — ASSESSMENT & PLAN NOTE
- patient reports that a sleep study is due  - snores at home and at risk for LUCRECIA  - CPAP tonight

## 2018-06-08 NOTE — H&P
"[facility]  Cardiology  History and Physical     Patient Name: Sara Grey  MRN: [id number]  Admission Date: 6/8/2018  Code Status: Full Code   Attending Provider: Ovi Ferraro MD   Primary Care Physician: Primary Doctor No  Principal Problem:<principal problem not specified>    Patient information was obtained from patient and past medical records.     Subjective:     Chief Complaint:  Tachycardia     HPI:  58 y/o female with PMH of HTN, DM, AF on Eliquis (several years of AF;  bleeding on NOAC), CVA (6/17 while on Pradaxa with residual speech impairment), COPD, LUCRECIA, and tobacco abuse presenting as an outpatient to Dr. Ferraro for XOCHILT occlusion device considation. The patient was to get a SADIE today, and staff noted stable AF with RVR with HRs in the 180-200s. Her planned CTA was cancelled. Diltiazem gtt was started, and the patient was transferred to the CCU for further care. The patient denies palpitations but felt "chest tightness and a little fluttering" when her HR was high in the lab. She reports SOB before it resolved when the diltiazem gtt was started. She denies a h/o AAD use. Her HR is currently in the 120s with SBP 110s with SpO2 96% on 4L O2. The patient snores at night.    TTE 4/2018    1 - Concentric hypertrophy.     2 - No wall motion abnormalities.     3 - Normal left ventricular systolic function (EF 55-60%).     4 - Normal left ventricular diastolic function.     5 - Low normal to mildly depressed right ventricular systolic function .     6 - The estimated PA systolic pressure is greater than 20 mmHg.     Review of Systems   Constitution: Negative for chills and fever.   HENT: Negative for ear discharge.    Eyes: Negative for pain and visual disturbance.   Cardiovascular: Positive for chest pain and irregular heartbeat. Negative for dyspnea on exertion, leg swelling, orthopnea, palpitations, paroxysmal nocturnal dyspnea and syncope.   Respiratory: Positive for " shortness of breath. Negative for hemoptysis and wheezing.    Skin: Negative for rash and suspicious lesions.   Musculoskeletal: Negative for joint pain and muscle weakness.   Gastrointestinal: Negative for abdominal pain, diarrhea, hematemesis, hematochezia, melena, nausea and vomiting.   Genitourinary: Negative for dysuria and frequency.   Neurological: Negative for focal weakness, headaches and tremors.   Psychiatric/Behavioral: Negative for altered mental status, suicidal ideas and thoughts of violence.     Objective:     Vital Signs (Most Recent):  Temp: 97.2 °F (36.2 °C) (06/08/18 1220)  Pulse: 102 (06/08/18 1220)  Resp: 18 (06/08/18 1220)  BP: 136/87 (06/08/18 1222)  SpO2: (!) 94 % (06/08/18 1220) Vital Signs (24h Range):  Temp:  [97.2 °F (36.2 °C)] 97.2 °F (36.2 °C)  Pulse:  [102] 102  Resp:  [18] 18  SpO2:  [94 %] 94 %  BP: (136-141)/(87-98) 136/87     Weight: (!) 158.8 kg (350 lb)  Body mass index is 62 kg/m².     SpO2: (!) 94 %  O2 Device (Oxygen Therapy): room air      Intake/Output Summary (Last 24 hours) at 06/08/18 1527  Last data filed at 06/08/18 1500   Gross per 24 hour   Intake              100 ml   Output                0 ml   Net              100 ml       Lines/Drains/Airways     Peripheral Intravenous Line                 Peripheral IV - Single Lumen 06/08/18 0834 Right Antecubital less than 1 day                Physical Exam   Constitutional: She is oriented to person, place, and time. She appears well-developed and well-nourished.   HENT:   Head: Normocephalic and atraumatic.   Eyes: EOM are normal.   Cardiovascular: An irregularly irregular rhythm present. Tachycardia present.  Exam reveals no gallop and no friction rub.    2+ b/l LE edema   Pulmonary/Chest: Effort normal and breath sounds normal. No stridor. She has no wheezes. She has no rales.   Abdominal: Soft. Bowel sounds are normal. There is no rebound and no guarding.   Musculoskeletal: She exhibits no edema.   Neurological: She is  alert and oriented to person, place, and time. No cranial nerve deficit.   Skin: Skin is warm and dry.   Psychiatric: She has a normal mood and affect. Her behavior is normal.       Significant Labs: All pertinent lab results from the last 24 hours have been reviewed.      Assessment and Plan:     Snoring    - patient reports that a sleep study is due  - snores at home and at risk for LUCRECIA  - CPAP tonight        Acute on chronic diastolic (congestive) heart failure    - IVP Lasix 60 mg x1  - female external catheter b/c patient HR increases to 170s with just minimal exertion  - HTN management; rate control strategy for now for AF          Persistent atrial fibrillation    - discussed with Dr. Ferraro: rate control strategy at least until euvolemic  - postpone XOCHILT occlusion device w/u as an inpatient  - c/w diltiazem gtt and add PO diltiazem 30 mg q6h to wean gtt  - goal K>4 and Mg>2  - c/w NOAC        Pure hypercholesterolemia    - c/w statin        Essential hypertension    - c/w ACEi, hydralazine-ISMN  - start PO diltiazem in lieu of metoprolol tartrate        Diabetes mellitus type 2 in obese    - SSI            VTE Risk Mitigation         Ordered     apixaban tablet 5 mg  2 times daily      06/08/18 3217          Kiran Vences MD  Cardiology   [facility]

## 2018-06-08 NOTE — PLAN OF CARE
Prior to the scheduled SADIE this afternoon, patient was noted to be actively wheezing requiring 2 breathing treatments while in the SADIE procedure room. Her HR was elevated to the 200's, alternating between AFib with RVR and SVT. B/P remained stable, ranging from the 130's to 180's systolic. 10 mg Diltiazem IV Bolus + diltiazem gtt @ 5 mg/hr was initiated, however treatment was initially delayed due to wait time from pharmacy. Patient B/P remained stable, HR reduced to less than 150, and patient was admitted to the CCU service for further management. CCU fellow notified.    Signed:  Pasha Woodward MD  Cardiology Fellow - PGY5  Pager: [phone]  6/8/2018 2:45 PM

## 2018-06-08 NOTE — PLAN OF CARE
Problem: Patient Care Overview  Goal: Plan of Care Review  Outcome: Ongoing (interventions implemented as appropriate)  Diltiazem gtt. Plan is to continue to monitor overnight with anticipated transfer to floor tomorrow morning. VS and assessment per flow sheet, patient progressing towards goals as tolerated, plan of care reviewed with Sara Grey and family, all concerns addressed, will continue to monitor.      Problem: Cardiac Rhythm Management Device (Adult)  Intervention: Support/Optimize Psychosocial Response to Condition  Past Medical History:   Diagnosis Date    Atrial fibrillation     COPD (chronic obstructive pulmonary disease)     CVA (cerebral vascular accident)     Diabetes mellitus     Hypertension

## 2018-06-08 NOTE — H&P
TRANSESOPHAGEAL ECHOCARDIOGRAPHY   PRE-PROCEDURE NOTE    06/08/2018    HPI:     Sara Grey is a 57 y.o. woman with PMHx of HTN, DM, AF, CVA, COPD, tobacco abuse who presents to [facility] for elective ADRIENNE as part of LAAO evaluation.    She follows Dr. Ferraro. She has history of AF dating back several years. She was placed on pradaxa for CVA prophylaxis. She suffered a CVA 6/17 on pradaxa leading to some speech impairment that is mostly improved. She was switched to eliquis but has had recurrent  bleeding. Her EF is normal at 55% but she has diastolic dynsfunction. She has moderate COPD, LUCRECIA and morbid obesity. No history of PVD.    Dysphagia or odynophagia:  No  Liver Disease, esophageal disease, or known varices:  No  Upper GI Bleeding: No  Snoring:  Yes  Sleep Apnea:  Yes  Prior neck surgery or radiation:  No  History of anesthetic difficulties:  No  Family history of anesthetic difficulties:  No  Last oral intake:  12 hours ago  Able to move neck in all directions:  Yes      Meds:     Scheduled Meds:  PRN Meds:  Continuous Infusions:    Physical Exam:     Vitals:          Constitutional: NAD, conversant  HEENT:   Sclera anicteric, Uvula midline, EOMI, OP clear  Neck:               No JVD, moves to all direction without any limitations  CV:               RRR, no murmurs / rubs / gallops, normal S1/S2  Pulm:               CTAB, no wheezes, rales, or ronchi  GI:               Abdomen soft, NTND, +BS  Extremities:              No LE edema, warm with palpable pulses  Neuro:   AAOX3, no focal motor deficits    Mallampati: 3  ASA: 3      Labs:     No results found for this or any previous visit (from the past 336 hour(s)).    No results found for this or any previous visit (from the past 336 hour(s)).    CrCl cannot be calculated (Patient's most recent lab result is older than the maximum 7 days allowed.).    INR: N/A. On Eliquis.    Imaging:  TTE (4/25/18):  CONCLUSIONS     1 - Concentric hypertrophy.     2 - No  wall motion abnormalities.     3 - Normal left ventricular systolic function (EF 55-60%).     4 - Normal left ventricular diastolic function.     5 - Low normal to mildly depressed right ventricular systolic function .     6 - The estimated PA systolic pressure is greater than 20 mmHg.     [id number]  pending          Assessment & Plan:     PLAN:  1. SADIE for evaluation of XOCHILT anatomy to assess suitability for LAAO device    -The risks, benefits & alternatives of the procedure were explained to the patient.    -The risks of transesophageal echo include but are not limited to:  Dental trauma, esophageal trauma/perforation, bleeding, laryngospasm/brochospasm, aspiration, sore throat/hoarseness, & dislodgement of the endotracheal tube/nasogastric tube (where applicable).    -The risks of moderate sedation include hypotension, respiratory depression, arrhythmias, bronchospasm, & death.    -Informed consent was obtained & the patient is agreeable to proceed with the procedure.    I will discuss with the attending physician. Attending addendum is to follow.      Attending addendum to follow     Nisreen Stanford MD  Cardiology Fellow  Pager: [phone]  2018 11:40 AM

## 2018-06-08 NOTE — HPI
"58 y/o female with PMH of HTN, DM, AF on Eliquis (several years of AF;  bleeding on NOAC), CVA (6/17 while on Pradaxa with residual speech impairment), COPD, LUCRECIA, and tobacco abuse presenting as an outpatient to Dr. Ferraro for XOCHILT occlusion device considation. The patient was to get a SADIE today, and staff noted stable AF with RVR with HRs in the 180-200s. Her planned CTA was cancelled. Diltiazem gtt was started, and the patient was transferred to the CCU for further care. The patient denies palpitations but felt "chest tightness and a little fluttering" when her HR was high in the lab. She reports SOB before it resolved when the diltiazem gtt was started. She denies a h/o AAD use. Her HR is currently in the 120s with SBP 110s with SpO2 96% on 4L O2. The patient snores at night.    TTE 4/2018    1 - Concentric hypertrophy.     2 - No wall motion abnormalities.     3 - Normal left ventricular systolic function (EF 55-60%).     4 - Normal left ventricular diastolic function.     5 - Low normal to mildly depressed right ventricular systolic function .     6 - The estimated PA systolic pressure is greater than 20 mmHg.   "

## 2018-06-08 NOTE — TRANSFER OF CARE
"Anesthesia Transfer of Care Note    Patient: Sara Grey    Procedure(s) Performed: Procedure(s) (LRB):  TRANSESOPHAGEAL ECHOCARDIOGRAM (SADIE) (N/A)    Patient location: ICU    Anesthesia Type: other: not performed    Transport from OR: Continuous ECG monitoring in transport. Continuos invasive BP monitoring in transport. Transported from OR on 2-3 L/min O2 by NC with adequate spontaneous ventilation. Continuous SpO2 monitoring in transport    Post pain: adequate analgesia    Post assessment: no apparent anesthetic complications    Post vital signs: stable    Level of consciousness: awake, alert and oriented    Nausea/Vomiting: no nausea/vomiting    Complications: none    Transfer of care protocol was followed      Last vitals:   Visit Vitals  /87 (BP Location: Right arm, Patient Position: Lying)   Pulse 102   Temp 36.2 °C (97.2 °F) (Oral)   Resp 18   Ht 5' 3" (1.6 m)   Wt (!) 158.8 kg (350 lb)   SpO2 (!) 94%   Breastfeeding? No   BMI 62.00 kg/m²     "

## 2018-06-08 NOTE — NURSING TRANSFER
Nursing Transfer Note      6/8/2018     Transfer To: 3078    Transfer via stretcher    Transfer with 4L to O2, cardiac monitoring    Transported by EP RN's    Medicines sent: diltiazem gtt    Chart send with patient: Yes    Notified: estephanie    Patient reassessed at: Jaclyn RN, present at bedside for handoff and assessment. PENG, ROOPA.    Upon arrival to floor: cardiac monitor applied, patient oriented to room, call bell in reach and bed in lowest position

## 2018-06-08 NOTE — ASSESSMENT & PLAN NOTE
- IVP Lasix 60 mg x1  - female external catheter b/c patient HR increases to 170s with just minimal exertion  - HTN management; rate control strategy for now for AF

## 2018-06-08 NOTE — ANESTHESIA PREPROCEDURE EVALUATION
06/08/2018  Sara Grey is a 57 y.o., female.    Anesthesia Evaluation         Review of Systems      Physical Exam  General:  Obesity    Airway/Jaw/Neck:  Airway Findings: Mouth Opening: Normal Tongue: Normal  General Airway Assessment: Adult  Mallampati: II  Improves to II with phonation.  TM Distance: Normal, at least 6 cm      Dental:  Dental Findings: In tact   Chest/Lungs:  Chest/Lungs Findings: Expiratory Wheezes, Mod., Normal Respiratory Rate     Heart/Vascular:  Heart Findings: Rate: Normal  Rhythm: Irregularly Irregular        Mental Status:  Mental Status Findings:  Cooperative, Alert and Oriented         Anesthesia Plan  Type of Anesthesia, risks & benefits discussed:  Anesthesia Type:  MAC  Patient's Preference:   Intra-op Monitoring Plan: standard ASA monitors  Intra-op Monitoring Plan Comments:   Post Op Pain Control Plan: multimodal analgesia  Post Op Pain Control Plan Comments:   Induction:   IV  Beta Blocker:  Patient is on a Beta-Blocker and has received one dose within the past 24 hours (No further documentation required).       Informed Consent: Patient understands risks and agrees with Anesthesia plan.  Questions answered. Anesthesia consent signed with patient.  ASA Score: 3     Day of Surgery Review of History & Physical:    H&P update referred to the surgeon.     Anesthesia Plan Notes: Espiratory wheezes bilaterally, and SaO2 < .90 on room air. Given sympathomimetic and parasympatholysis inhalers with improvemt in subjective and objective findings. Service notified of events.        Ready For Surgery From Anesthesia Perspective.

## 2018-06-08 NOTE — ASSESSMENT & PLAN NOTE
- discussed with Dr. Ferraro: rate control strategy at least until euvolemic  - postpone XOCHILT occlusion device w/u as an inpatient  - c/w diltiazem gtt and add PO diltiazem 30 mg q6h to wean gtt  - goal K>4 and Mg>2  - c/w NOAC

## 2018-06-09 PROBLEM — J44.9 COPD (CHRONIC OBSTRUCTIVE PULMONARY DISEASE): Status: ACTIVE | Noted: 2018-06-09

## 2018-06-09 PROBLEM — I48.91 ATRIAL FIBRILLATION WITH RAPID VENTRICULAR RESPONSE: Status: ACTIVE | Noted: 2018-06-09

## 2018-06-09 LAB
ALBUMIN SERPL BCP-MCNC: 3.2 G/DL
ALP SERPL-CCNC: 99 U/L
ALT SERPL W/O P-5'-P-CCNC: 13 U/L
ANION GAP SERPL CALC-SCNC: 9 MMOL/L
AST SERPL-CCNC: 12 U/L
BASOPHILS # BLD AUTO: 0.03 K/UL
BASOPHILS NFR BLD: 0.3 %
BILIRUB SERPL-MCNC: 0.6 MG/DL
BUN SERPL-MCNC: 19 MG/DL
CALCIUM SERPL-MCNC: 9 MG/DL
CHLORIDE SERPL-SCNC: 105 MMOL/L
CO2 SERPL-SCNC: 27 MMOL/L
CREAT SERPL-MCNC: 1.3 MG/DL
DIFFERENTIAL METHOD: ABNORMAL
EOSINOPHIL # BLD AUTO: 0.2 K/UL
EOSINOPHIL NFR BLD: 1.4 %
ERYTHROCYTE [DISTWIDTH] IN BLOOD BY AUTOMATED COUNT: 15.4 %
EST. GFR  (AFRICAN AMERICAN): 53.4 ML/MIN/1.73 M^2
EST. GFR  (NON AFRICAN AMERICAN): 46.3 ML/MIN/1.73 M^2
GLUCOSE SERPL-MCNC: 199 MG/DL
HCT VFR BLD AUTO: 40.7 %
HGB BLD-MCNC: 12.6 G/DL
IMM GRANULOCYTES # BLD AUTO: 0.04 K/UL
IMM GRANULOCYTES NFR BLD AUTO: 0.3 %
LYMPHOCYTES # BLD AUTO: 1.3 K/UL
LYMPHOCYTES NFR BLD: 11.1 %
MAGNESIUM SERPL-MCNC: 2.1 MG/DL
MCH RBC QN AUTO: 27.5 PG
MCHC RBC AUTO-ENTMCNC: 31 G/DL
MCV RBC AUTO: 89 FL
MONOCYTES # BLD AUTO: 0.8 K/UL
MONOCYTES NFR BLD: 6.8 %
NEUTROPHILS # BLD AUTO: 9.4 K/UL
NEUTROPHILS NFR BLD: 80.1 %
NRBC BLD-RTO: 0 /100 WBC
PHOSPHATE SERPL-MCNC: 4.2 MG/DL
PLATELET # BLD AUTO: 287 K/UL
PMV BLD AUTO: 10.8 FL
POCT GLUCOSE: 140 MG/DL (ref 70–110)
POCT GLUCOSE: 142 MG/DL (ref 70–110)
POCT GLUCOSE: 162 MG/DL (ref 70–110)
POCT GLUCOSE: 208 MG/DL (ref 70–110)
POTASSIUM SERPL-SCNC: 4.3 MMOL/L
PROT SERPL-MCNC: 6.9 G/DL
RBC # BLD AUTO: 4.58 M/UL
SODIUM SERPL-SCNC: 141 MMOL/L
WBC # BLD AUTO: 11.7 K/UL

## 2018-06-09 PROCEDURE — 84100 ASSAY OF PHOSPHORUS: CPT

## 2018-06-09 PROCEDURE — 25000003 PHARM REV CODE 250: Performed by: INTERNAL MEDICINE

## 2018-06-09 PROCEDURE — 83735 ASSAY OF MAGNESIUM: CPT

## 2018-06-09 PROCEDURE — 80053 COMPREHEN METABOLIC PANEL: CPT

## 2018-06-09 PROCEDURE — 20000000 HC ICU ROOM

## 2018-06-09 PROCEDURE — 85025 COMPLETE CBC W/AUTO DIFF WBC: CPT

## 2018-06-09 PROCEDURE — 94761 N-INVAS EAR/PLS OXIMETRY MLT: CPT

## 2018-06-09 PROCEDURE — 94660 CPAP INITIATION&MGMT: CPT

## 2018-06-09 PROCEDURE — 99222 1ST HOSP IP/OBS MODERATE 55: CPT | Mod: AI,GC,, | Performed by: INTERNAL MEDICINE

## 2018-06-09 PROCEDURE — 99900035 HC TECH TIME PER 15 MIN (STAT)

## 2018-06-09 PROCEDURE — 27000221 HC OXYGEN, UP TO 24 HOURS

## 2018-06-09 PROCEDURE — 63600175 PHARM REV CODE 636 W HCPCS: Performed by: INTERNAL MEDICINE

## 2018-06-09 RX ORDER — FUROSEMIDE 10 MG/ML
80 INJECTION INTRAMUSCULAR; INTRAVENOUS 2 TIMES DAILY
Status: DISCONTINUED | OUTPATIENT
Start: 2018-06-09 | End: 2018-06-11

## 2018-06-09 RX ADMIN — DILTIAZEM HYDROCHLORIDE 30 MG: 30 TABLET, FILM COATED ORAL at 05:06

## 2018-06-09 RX ADMIN — APIXABAN 5 MG: 2.5 TABLET, FILM COATED ORAL at 08:06

## 2018-06-09 RX ADMIN — FUROSEMIDE 80 MG: 10 INJECTION, SOLUTION INTRAMUSCULAR; INTRAVENOUS at 05:06

## 2018-06-09 RX ADMIN — HYDRALAZINE HYDROCHLORIDE 50 MG: 50 TABLET ORAL at 09:06

## 2018-06-09 RX ADMIN — PANTOPRAZOLE SODIUM 40 MG: 40 TABLET, DELAYED RELEASE ORAL at 09:06

## 2018-06-09 RX ADMIN — ISOSORBIDE MONONITRATE 30 MG: 30 TABLET, EXTENDED RELEASE ORAL at 10:06

## 2018-06-09 RX ADMIN — INSULIN ASPART 1 UNITS: 100 INJECTION, SOLUTION INTRAVENOUS; SUBCUTANEOUS at 09:06

## 2018-06-09 RX ADMIN — PRAVASTATIN SODIUM 40 MG: 40 TABLET ORAL at 09:06

## 2018-06-09 RX ADMIN — LISINOPRIL 2.5 MG: 2.5 TABLET ORAL at 09:06

## 2018-06-09 RX ADMIN — HYDRALAZINE HYDROCHLORIDE 50 MG: 50 TABLET ORAL at 08:06

## 2018-06-09 RX ADMIN — APIXABAN 5 MG: 2.5 TABLET, FILM COATED ORAL at 09:06

## 2018-06-09 RX ADMIN — FUROSEMIDE 80 MG: 10 INJECTION, SOLUTION INTRAMUSCULAR; INTRAVENOUS at 09:06

## 2018-06-09 RX ADMIN — DILTIAZEM HYDROCHLORIDE 30 MG: 30 TABLET, FILM COATED ORAL at 12:06

## 2018-06-09 RX ADMIN — DILTIAZEM HYDROCHLORIDE 30 MG: 30 TABLET, FILM COATED ORAL at 11:06

## 2018-06-09 NOTE — ASSESSMENT & PLAN NOTE
- discussed with Dr. Ferraro yesterday: rate control strategy at least until euvolemic  - postpone XOCHILT occlusion device w/u as an inpatient  - c/w PO diltiazem 30 mg q6h   - goal K>4 and Mg>2  - c/w NOAC

## 2018-06-09 NOTE — ASSESSMENT & PLAN NOTE
- continues to have RVR and mild hypoxia with minimal exertion  - HTN management; rate control strategy for now for AF  - start IVP Lasix 80 mg BID  - PT/OT

## 2018-06-09 NOTE — ASSESSMENT & PLAN NOTE
- patient reports that a sleep study is due  - snores at home and at risk for LUCRECIA  - CPAP nightly at least while in house

## 2018-06-09 NOTE — SUBJECTIVE & OBJECTIVE
Interval History: The patient's diltiazem was stopped at 3A.    Review of Systems   Constitution: Negative for chills and fever.   HENT: Negative for ear discharge.    Eyes: Negative for pain and visual disturbance.   Cardiovascular: Negative for chest pain, dyspnea on exertion, irregular heartbeat, leg swelling, orthopnea, palpitations, paroxysmal nocturnal dyspnea and syncope.   Respiratory: Negative for hemoptysis, shortness of breath and wheezing.    Skin: Negative for rash and suspicious lesions.   Musculoskeletal: Negative for joint pain and muscle weakness.   Gastrointestinal: Negative for abdominal pain, diarrhea, hematemesis, hematochezia, melena, nausea and vomiting.   Genitourinary: Negative for dysuria and frequency.   Neurological: Negative for focal weakness, headaches and tremors.   Psychiatric/Behavioral: Negative for altered mental status, suicidal ideas and thoughts of violence.     Objective:     Vital Signs (Most Recent):  Temp: 98.2 °F (36.8 °C) (06/09/18 1500)  Pulse: 106 (06/09/18 1500)  Resp: 18 (06/09/18 1500)  BP: (!) 162/75 (06/09/18 1500)  SpO2: (!) 92 % (06/09/18 1500) Vital Signs (24h Range):  Temp:  [98.2 °F (36.8 °C)-98.8 °F (37.1 °C)] 98.2 °F (36.8 °C)  Pulse:  [] 106  Resp:  [] 18  SpO2:  [89 %-99 %] 92 %  BP: (120-173)/() 162/75     Weight: (!) 158.7 kg (349 lb 13.9 oz)  Body mass index is 61.98 kg/m².     SpO2: (!) 92 %  O2 Device (Oxygen Therapy): nasal cannula w/ humidification      Intake/Output Summary (Last 24 hours) at 06/09/18 1524  Last data filed at 06/09/18 1400   Gross per 24 hour   Intake           800.24 ml   Output             1600 ml   Net          -799.76 ml       Lines/Drains/Airways     Drain            Female External Urinary Catheter 06/08/18 1930 less than 1 day          Peripheral Intravenous Line                 Peripheral IV - Single Lumen 06/08/18 0834 Right Antecubital 1 day         Peripheral IV - Single Lumen 06/08/18 1700 Left  Antecubital less than 1 day                Physical Exam   Constitutional: She is oriented to person, place, and time. She appears well-developed and well-nourished.   HENT:   Head: Normocephalic and atraumatic.   Eyes: EOM are normal.   Cardiovascular: Normal rate.  An irregularly irregular rhythm present. Exam reveals no gallop and no friction rub.    Pulmonary/Chest: Effort normal and breath sounds normal. No stridor. She has no wheezes. She has no rales.   Abdominal: Soft. Bowel sounds are normal. There is no rebound and no guarding.   Musculoskeletal: She exhibits no edema.   Neurological: She is alert and oriented to person, place, and time. No cranial nerve deficit.   Skin: Skin is warm and dry.   Psychiatric: She has a normal mood and affect. Her behavior is normal.       Significant Labs: All pertinent lab results from the last 24 hours have been reviewed.

## 2018-06-09 NOTE — PROCEDURES
CENTRAL VENOUS LINE INSERTION:    Indications: vascular access - emergency placement; venous access obtained on first stick    Antisepsis: sterile gloves, mask, gown, and drape.  Skin prepped with chlorhexidine.  Catheter: Triple lumen via right femoral.  Insertion directed by anatomic landmarks.  Heme positive aspiration all ports.  Secured with sutures at skin.  Dressing: dry sterile gauze and bio occlusive dressing.  Complications: none.

## 2018-06-09 NOTE — PLAN OF CARE
Pt weaned off the cardizem drip during the night. Slept with CPAP. VSS with occasional hypertension that correlated with need for next cardizem PO or hydralazine. Sounds a little wheezy this morning and ambulated to the toilet without issue. Made plenty of measured and unmeasured urine output. Overall still had a good night. Remains Afib with a more controlled rate. Will continue to monitor the Pt.

## 2018-06-09 NOTE — PROCEDURES
CENTRAL VENOUS LINE INSERTION:    Indications: hemodialysis    Antisepsis: sterile gloves, mask, gown, and drape.  Skin prepped with chlorhexidine.  Catheter: Trialysis via right internal jugular.  Insertion directed by ultrasound and confirmation of venous placement completed with manometry/VBG.  Heme positive aspiration all ports.  Secured with sutures skin.  Dressing: dry sterile gauze and bio occlusive dressing.  Immediate complications: none. CXR ordered to confirm precise venous placement.

## 2018-06-09 NOTE — PROGRESS NOTES
[facility]  Cardiology  Progress Note    Patient Name: Sara Grey  MRN: [id number]  Admission Date: 6/8/2018  Hospital Length of Stay: 1 days  Code Status: Full Code   Attending Physician: Flora Mac MD   Primary Care Physician: Primary Doctor No  Expected Discharge Date:   Principal Problem:<principal problem not specified>    Subjective:     Interval History: The patient's diltiazem was stopped at 3A.    Review of Systems   Constitution: Negative for chills and fever.   HENT: Negative for ear discharge.    Eyes: Negative for pain and visual disturbance.   Cardiovascular: Negative for chest pain, dyspnea on exertion, irregular heartbeat, leg swelling, orthopnea, palpitations, paroxysmal nocturnal dyspnea and syncope.   Respiratory: Negative for hemoptysis, shortness of breath and wheezing.    Skin: Negative for rash and suspicious lesions.   Musculoskeletal: Negative for joint pain and muscle weakness.   Gastrointestinal: Negative for abdominal pain, diarrhea, hematemesis, hematochezia, melena, nausea and vomiting.   Genitourinary: Negative for dysuria and frequency.   Neurological: Negative for focal weakness, headaches and tremors.   Psychiatric/Behavioral: Negative for altered mental status, suicidal ideas and thoughts of violence.     Objective:     Vital Signs (Most Recent):  Temp: 98.2 °F (36.8 °C) (06/09/18 1500)  Pulse: 106 (06/09/18 1500)  Resp: 18 (06/09/18 1500)  BP: (!) 162/75 (06/09/18 1500)  SpO2: (!) 92 % (06/09/18 1500) Vital Signs (24h Range):  Temp:  [98.2 °F (36.8 °C)-98.8 °F (37.1 °C)] 98.2 °F (36.8 °C)  Pulse:  [] 106  Resp:  [] 18  SpO2:  [89 %-99 %] 92 %  BP: (120-173)/() 162/75     Weight: (!) 158.7 kg (349 lb 13.9 oz)  Body mass index is 61.98 kg/m².     SpO2: (!) 92 %  O2 Device (Oxygen Therapy): nasal cannula w/ humidification      Intake/Output Summary (Last 24 hours) at 06/09/18 1524  Last data filed at 06/09/18 1400   Gross per 24 hour    Intake           800.24 ml   Output             1600 ml   Net          -799.76 ml       Lines/Drains/Airways     Drain            Female External Urinary Catheter 06/08/18 1930 less than 1 day          Peripheral Intravenous Line                 Peripheral IV - Single Lumen 06/08/18 0834 Right Antecubital 1 day         Peripheral IV - Single Lumen 06/08/18 1700 Left Antecubital less than 1 day                Physical Exam   Constitutional: She is oriented to person, place, and time. She appears well-developed and well-nourished.   HENT:   Head: Normocephalic and atraumatic.   Eyes: EOM are normal.   Cardiovascular: Normal rate.  An irregularly irregular rhythm present. Exam reveals no gallop and no friction rub.    Pulmonary/Chest: Effort normal and breath sounds normal. No stridor. She has no wheezes. She has no rales.   Abdominal: Soft. Bowel sounds are normal. There is no rebound and no guarding.   Musculoskeletal: She exhibits no edema.   Neurological: She is alert and oriented to person, place, and time. No cranial nerve deficit.   Skin: Skin is warm and dry.   Psychiatric: She has a normal mood and affect. Her behavior is normal.       Significant Labs: All pertinent lab results from the last 24 hours have been reviewed.      Assessment and Plan:     Snoring    - patient reports that a sleep study is due  - snores at home and at risk for LUCRECIA  - CPAP nightly at least while in house        Acute on chronic diastolic (congestive) heart failure    - continues to have RVR and mild hypoxia with minimal exertion  - HTN management; rate control strategy for now for AF  - start IVP Lasix 80 mg BID  - PT/OT          Persistent atrial fibrillation    - discussed with Dr. Ferraro yesterday: rate control strategy at least until euvolemic  - postpone XOCHILT occlusion device w/u as an inpatient  - c/w PO diltiazem 30 mg q6h   - goal K>4 and Mg>2  - c/w NOAC        Pure hypercholesterolemia    - c/w statin        Essential  hypertension    - c/w ACEi, hydralazine-ISMN  - c/w PO diltiazem in lieu of metoprolol tartrate        Diabetes mellitus type 2 in obese    - SSI            VTE Risk Mitigation         Ordered     apixaban tablet 5 mg  2 times daily      06/08/18 0710          Kiran Vences MD  Cardiology  [facility]

## 2018-06-09 NOTE — PLAN OF CARE
Problem: Patient Care Overview  Goal: Plan of Care Review  Outcome: Ongoing (interventions implemented as appropriate)  No acute events throughout day, VS and assessment per flow sheet. PO Cardizem q6hr and 80mg IV lasix BID. BP stable and HR afib with rate <120. 4.5 liters of O2 NC during the day and CPAP at night. Purwick in place and UO >1 liter. One BM today. Patient able to ambulate to the bathroom with one person assist. Fiance at bedside. Plan of care reviewed with Sara Grey and family, all concerns addressed, will continue to monitor.

## 2018-06-10 PROBLEM — I48.91 AF (ATRIAL FIBRILLATION): Status: ACTIVE | Noted: 2018-06-10

## 2018-06-10 PROBLEM — I48.19 PERSISTENT ATRIAL FIBRILLATION: Status: RESOLVED | Noted: 2018-04-19 | Resolved: 2018-06-10

## 2018-06-10 LAB
ALBUMIN SERPL BCP-MCNC: 3.2 G/DL
ALP SERPL-CCNC: 97 U/L
ALT SERPL W/O P-5'-P-CCNC: 12 U/L
ANION GAP SERPL CALC-SCNC: 10 MMOL/L
AST SERPL-CCNC: 10 U/L
BASOPHILS # BLD AUTO: 0.02 K/UL
BASOPHILS NFR BLD: 0.2 %
BILIRUB SERPL-MCNC: 0.7 MG/DL
BUN SERPL-MCNC: 18 MG/DL
CALCIUM SERPL-MCNC: 9.1 MG/DL
CHLORIDE SERPL-SCNC: 103 MMOL/L
CO2 SERPL-SCNC: 29 MMOL/L
CREAT SERPL-MCNC: 1.2 MG/DL
DIFFERENTIAL METHOD: ABNORMAL
EOSINOPHIL # BLD AUTO: 0.2 K/UL
EOSINOPHIL NFR BLD: 1.7 %
ERYTHROCYTE [DISTWIDTH] IN BLOOD BY AUTOMATED COUNT: 15.2 %
EST. GFR  (AFRICAN AMERICAN): 58.8 ML/MIN/1.73 M^2
EST. GFR  (NON AFRICAN AMERICAN): 51 ML/MIN/1.73 M^2
GLUCOSE SERPL-MCNC: 168 MG/DL
HCT VFR BLD AUTO: 41.9 %
HGB BLD-MCNC: 13.1 G/DL
IMM GRANULOCYTES # BLD AUTO: 0.03 K/UL
IMM GRANULOCYTES NFR BLD AUTO: 0.3 %
LYMPHOCYTES # BLD AUTO: 1.2 K/UL
LYMPHOCYTES NFR BLD: 12.3 %
MAGNESIUM SERPL-MCNC: 1.8 MG/DL
MCH RBC QN AUTO: 27.6 PG
MCHC RBC AUTO-ENTMCNC: 31.3 G/DL
MCV RBC AUTO: 88 FL
MONOCYTES # BLD AUTO: 0.6 K/UL
MONOCYTES NFR BLD: 6.4 %
NEUTROPHILS # BLD AUTO: 7.7 K/UL
NEUTROPHILS NFR BLD: 79.1 %
NRBC BLD-RTO: 0 /100 WBC
PHOSPHATE SERPL-MCNC: 3.3 MG/DL
PLATELET # BLD AUTO: 289 K/UL
PMV BLD AUTO: 10.9 FL
POCT GLUCOSE: 131 MG/DL (ref 70–110)
POCT GLUCOSE: 183 MG/DL (ref 70–110)
POCT GLUCOSE: 184 MG/DL (ref 70–110)
POCT GLUCOSE: 231 MG/DL (ref 70–110)
POTASSIUM SERPL-SCNC: 3.9 MMOL/L
PROT SERPL-MCNC: 7 G/DL
RBC # BLD AUTO: 4.74 M/UL
SODIUM SERPL-SCNC: 142 MMOL/L
WBC # BLD AUTO: 9.73 K/UL

## 2018-06-10 PROCEDURE — 94761 N-INVAS EAR/PLS OXIMETRY MLT: CPT

## 2018-06-10 PROCEDURE — 84100 ASSAY OF PHOSPHORUS: CPT

## 2018-06-10 PROCEDURE — 85025 COMPLETE CBC W/AUTO DIFF WBC: CPT

## 2018-06-10 PROCEDURE — 63600175 PHARM REV CODE 636 W HCPCS: Performed by: INTERNAL MEDICINE

## 2018-06-10 PROCEDURE — 27000221 HC OXYGEN, UP TO 24 HOURS

## 2018-06-10 PROCEDURE — 80053 COMPREHEN METABOLIC PANEL: CPT

## 2018-06-10 PROCEDURE — 99900035 HC TECH TIME PER 15 MIN (STAT)

## 2018-06-10 PROCEDURE — 20000000 HC ICU ROOM

## 2018-06-10 PROCEDURE — 25000003 PHARM REV CODE 250: Performed by: INTERNAL MEDICINE

## 2018-06-10 PROCEDURE — 83735 ASSAY OF MAGNESIUM: CPT

## 2018-06-10 PROCEDURE — 25000003 PHARM REV CODE 250: Performed by: STUDENT IN AN ORGANIZED HEALTH CARE EDUCATION/TRAINING PROGRAM

## 2018-06-10 PROCEDURE — 94660 CPAP INITIATION&MGMT: CPT

## 2018-06-10 PROCEDURE — A4216 STERILE WATER/SALINE, 10 ML: HCPCS | Performed by: INTERNAL MEDICINE

## 2018-06-10 PROCEDURE — 99232 SBSQ HOSP IP/OBS MODERATE 35: CPT | Mod: ,,, | Performed by: INTERNAL MEDICINE

## 2018-06-10 RX ORDER — INSULIN ASPART 100 [IU]/ML
0-5 INJECTION, SOLUTION INTRAVENOUS; SUBCUTANEOUS
Status: DISCONTINUED | OUTPATIENT
Start: 2018-06-10 | End: 2018-06-11 | Stop reason: HOSPADM

## 2018-06-10 RX ORDER — METOPROLOL TARTRATE 50 MG/1
50 TABLET ORAL 3 TIMES DAILY
Status: DISCONTINUED | OUTPATIENT
Start: 2018-06-10 | End: 2018-06-11 | Stop reason: HOSPADM

## 2018-06-10 RX ADMIN — FUROSEMIDE 80 MG: 10 INJECTION, SOLUTION INTRAMUSCULAR; INTRAVENOUS at 07:06

## 2018-06-10 RX ADMIN — METOPROLOL TARTRATE 50 MG: 50 TABLET ORAL at 08:06

## 2018-06-10 RX ADMIN — INSULIN ASPART 2 UNITS: 100 INJECTION, SOLUTION INTRAVENOUS; SUBCUTANEOUS at 04:06

## 2018-06-10 RX ADMIN — DILTIAZEM HYDROCHLORIDE 30 MG: 30 TABLET, FILM COATED ORAL at 05:06

## 2018-06-10 RX ADMIN — Medication 800 MG: at 07:06

## 2018-06-10 RX ADMIN — ISOSORBIDE MONONITRATE 30 MG: 30 TABLET, EXTENDED RELEASE ORAL at 07:06

## 2018-06-10 RX ADMIN — HYDRALAZINE HYDROCHLORIDE 50 MG: 50 TABLET ORAL at 07:06

## 2018-06-10 RX ADMIN — HYDRALAZINE HYDROCHLORIDE 50 MG: 50 TABLET ORAL at 09:06

## 2018-06-10 RX ADMIN — PANTOPRAZOLE SODIUM 40 MG: 40 TABLET, DELAYED RELEASE ORAL at 07:06

## 2018-06-10 RX ADMIN — PRAVASTATIN SODIUM 40 MG: 40 TABLET ORAL at 07:06

## 2018-06-10 RX ADMIN — Medication 800 MG: at 05:06

## 2018-06-10 RX ADMIN — METOPROLOL TARTRATE 50 MG: 50 TABLET ORAL at 03:06

## 2018-06-10 RX ADMIN — FUROSEMIDE 80 MG: 10 INJECTION, SOLUTION INTRAMUSCULAR; INTRAVENOUS at 05:06

## 2018-06-10 RX ADMIN — APIXABAN 5 MG: 2.5 TABLET, FILM COATED ORAL at 08:06

## 2018-06-10 RX ADMIN — APIXABAN 5 MG: 2.5 TABLET, FILM COATED ORAL at 07:06

## 2018-06-10 RX ADMIN — LISINOPRIL 2.5 MG: 2.5 TABLET ORAL at 07:06

## 2018-06-10 RX ADMIN — SODIUM CHLORIDE, PRESERVATIVE FREE 3 ML: 5 INJECTION INTRAVENOUS at 05:06

## 2018-06-10 RX ADMIN — DILTIAZEM HYDROCHLORIDE 30 MG: 30 TABLET, FILM COATED ORAL at 11:06

## 2018-06-10 NOTE — SUBJECTIVE & OBJECTIVE
Interval History:No acute events overnight. Vitals within normal. Pt denies any palpitations, chest pain or SOB. She received relief from CPAP use overnight and agrees to pursue a sleep study test outpatient.     Review of Systems   Constitution: Negative for chills and fever.   HENT: Negative for ear discharge.    Eyes: Negative for pain and visual disturbance.   Cardiovascular: Negative for chest pain, dyspnea on exertion, irregular heartbeat, leg swelling, orthopnea, palpitations, paroxysmal nocturnal dyspnea and syncope.   Respiratory: Negative for hemoptysis, shortness of breath and wheezing.    Skin: Negative for rash and suspicious lesions.   Musculoskeletal: Negative for joint pain and muscle weakness.   Gastrointestinal: Negative for abdominal pain, diarrhea, hematemesis, hematochezia, melena, nausea and vomiting.   Genitourinary: Negative for dysuria and frequency.   Neurological: Negative for focal weakness, headaches and tremors.   Psychiatric/Behavioral: Negative for altered mental status, suicidal ideas and thoughts of violence.     Objective:     Vital Signs (Most Recent):  Temp: 98 °F (36.7 °C) (06/10/18 1100)  Pulse: 101 (06/10/18 1200)  Resp: 17 (06/10/18 1200)  BP: 116/69 (06/10/18 1200)  SpO2: 95 % (06/10/18 1200) Vital Signs (24h Range):  Temp:  [98 °F (36.7 °C)-98.5 °F (36.9 °C)] 98 °F (36.7 °C)  Pulse:  [] 101  Resp:  [11-29] 17  SpO2:  [90 %-100 %] 95 %  BP: ()/(53-95) 116/69     Weight: (!) 158.7 kg (349 lb 13.9 oz)  Body mass index is 61.98 kg/m².     SpO2: 95 %  O2 Device (Oxygen Therapy): nasal cannula w/ humidification      Intake/Output Summary (Last 24 hours) at 06/10/18 1354  Last data filed at 06/10/18 1200   Gross per 24 hour   Intake              343 ml   Output             2800 ml   Net            -2457 ml       Lines/Drains/Airways     Drain            Female External Urinary Catheter 06/08/18 1930 1 day          Peripheral Intravenous Line                 Peripheral  IV - Single Lumen 06/08/18 0834 Right Antecubital 2 days         Peripheral IV - Single Lumen 06/08/18 1700 Left Antecubital 1 day                Physical Exam   Constitutional: She is oriented to person, place, and time. She appears well-developed and well-nourished.   HENT:   Head: Normocephalic and atraumatic.   Eyes: EOM are normal.   Cardiovascular: Normal rate.  An irregularly irregular rhythm present. Exam reveals no gallop and no friction rub.    Pulmonary/Chest: Effort normal and breath sounds normal. No stridor. She has no wheezes. She has no rales.   Abdominal: Soft. Bowel sounds are normal. There is no rebound and no guarding.   Musculoskeletal: She exhibits no edema.   Neurological: She is alert and oriented to person, place, and time. No cranial nerve deficit.   Skin: Skin is warm and dry.   Psychiatric: She has a normal mood and affect. Her behavior is normal.       Significant Labs: All pertinent lab results from the last 24 hours have been reviewed.

## 2018-06-10 NOTE — PLAN OF CARE
Pt had a good night. Excellent UOP. No pain. Afib rate controlled with PO cardizem. VSS. No S/Sx of distress. Will continue to monitor the pt.

## 2018-06-10 NOTE — PLAN OF CARE
Problem: Patient Care Overview  Goal: Plan of Care Review  Outcome: Ongoing (interventions implemented as appropriate)  No acute events throughout day, VS and assessment per flow sheet. Patient switched from diltiazem to metoprolol today. Plan to step down this evening. Plan of care reviewed with Sara Grey and family, all concerns addressed, will continue to monitor.

## 2018-06-10 NOTE — PROGRESS NOTES
[facility]  Cardiology  Progress Note    Patient Name: Sara Grey  MRN: [id number]  Admission Date: 6/8/2018  Hospital Length of Stay: 2 days  Code Status: Full Code   Attending Physician: Flora Mac MD   Primary Care Physician: Primary Doctor No  Expected Discharge Date:   Principal Problem:Atrial fibrillation with rapid ventricular response    Subjective:     Hospital Course:   Pt was admitted to the CCU. She was rate controlled with IV diltiazem and switched over to PO. She was subsequently switched over to PO metoprolol 50mg TID and will be stepped down to hospital herrmann today. Pt's oxygen requirements are decreasing appropriately.    Interval History:No acute events overnight. Vitals within normal. Pt denies any palpitations, chest pain or SOB. She received relief from CPAP use overnight and agrees to pursue a sleep study test outpatient.     Review of Systems   Constitution: Negative for chills and fever.   HENT: Negative for ear discharge.    Eyes: Negative for pain and visual disturbance.   Cardiovascular: Negative for chest pain, dyspnea on exertion, irregular heartbeat, leg swelling, orthopnea, palpitations, paroxysmal nocturnal dyspnea and syncope.   Respiratory: Negative for hemoptysis, shortness of breath and wheezing.    Skin: Negative for rash and suspicious lesions.   Musculoskeletal: Negative for joint pain and muscle weakness.   Gastrointestinal: Negative for abdominal pain, diarrhea, hematemesis, hematochezia, melena, nausea and vomiting.   Genitourinary: Negative for dysuria and frequency.   Neurological: Negative for focal weakness, headaches and tremors.   Psychiatric/Behavioral: Negative for altered mental status, suicidal ideas and thoughts of violence.     Objective:     Vital Signs (Most Recent):  Temp: 98 °F (36.7 °C) (06/10/18 1100)  Pulse: 101 (06/10/18 1200)  Resp: 17 (06/10/18 1200)  BP: 116/69 (06/10/18 1200)  SpO2: 95 % (06/10/18 1200) Vital Signs (24h  Range):  Temp:  [98 °F (36.7 °C)-98.5 °F (36.9 °C)] 98 °F (36.7 °C)  Pulse:  [] 101  Resp:  [11-29] 17  SpO2:  [90 %-100 %] 95 %  BP: ()/(53-95) 116/69     Weight: (!) 158.7 kg (349 lb 13.9 oz)  Body mass index is 61.98 kg/m².     SpO2: 95 %  O2 Device (Oxygen Therapy): nasal cannula w/ humidification      Intake/Output Summary (Last 24 hours) at 06/10/18 1354  Last data filed at 06/10/18 1200   Gross per 24 hour   Intake              343 ml   Output             2800 ml   Net            -2457 ml       Lines/Drains/Airways     Drain            Female External Urinary Catheter 06/08/18 1930 1 day          Peripheral Intravenous Line                 Peripheral IV - Single Lumen 06/08/18 0834 Right Antecubital 2 days         Peripheral IV - Single Lumen 06/08/18 1700 Left Antecubital 1 day                Physical Exam   Constitutional: She is oriented to person, place, and time. She appears well-developed and well-nourished.   HENT:   Head: Normocephalic and atraumatic.   Eyes: EOM are normal.   Cardiovascular: Normal rate.  An irregularly irregular rhythm present. Exam reveals no gallop and no friction rub.    Pulmonary/Chest: Effort normal and breath sounds normal. No stridor. She has no wheezes. She has no rales.   Abdominal: Soft. Bowel sounds are normal. There is no rebound and no guarding.   Musculoskeletal: She exhibits no edema.   Neurological: She is alert and oriented to person, place, and time. No cranial nerve deficit.   Skin: Skin is warm and dry.   Psychiatric: She has a normal mood and affect. Her behavior is normal.       Significant Labs: All pertinent lab results from the last 24 hours have been reviewed.      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 Ferraro for XOCHILT occlusion  device considation. The patient was to get a SADIE 6/8 when staff noted AF with RVR (HRs 180-200s). Her planned CTA was cancelled and patient was admitted for rate control and diuresis. She was monitored in CCU for 2 days.    * Atrial fibrillation with rapid ventricular response    Chronic persistent atrial fibrillation  - pt of Dr. Ferraro. Plan for rate control strategy until pt is euvolemic  - postpone XOCHILT occlusion device w/u as an inpatient  - rate control - s/p IV diltiazem -> PO diltiazem -> switched to PO metoprolol 50mg TID today  - CHADSVASC: 5 suggestive of 7.2% annual stroke risk; will continue Apixaban  - goal K > 4, Mg > 2        Acute on chronic diastolic (congestive) heart failure    - acute exacerbation likely due to Afib with RVR  - ECHO 4/18: EF 55-60% with normal diastolic dysfunction  - continue IV lasix 80mg BID; will consider switching to PO lasix tomorrow   - strict I & O  - daily weights  - cardiac diet        Pure hypercholesterolemia    - c/w home pravastatin 40 mg daily        Essential hypertension    - c/w ACEi, hydralazine-ISMN  - PO diltiazem switched to metoprolol tartrate        Cerebrovascular accident (CVA) due to embolism    - continue RF modification, including Afib management  - PT/OT         Diabetes mellitus type 2 in obese    - A1c 6.1  - LD SSI  - POCT AC & HS  - diabetic diet        BMI 60.0-69.9, adult    Obesity - chronic; will need to discuss weight loss options.  - likely also has obstructive sleep apnea, for which CPAP overnight was very useful. Will place ambulatory consult for Sleep study on discharge & continue CPAP nightly while inpatient.         Snoring    - patient reports that a sleep study is due  - snores at home and at risk for LUCRECIA  - CPAP nightly at least while in house            VTE Risk Mitigation         Ordered     apixaban tablet 5 mg  2 times daily      06/08/18 1517      Diet: cardiac + diet  Full code    Dispo: Plan to step down to floor today.  Cardiology CCU team will continue to follow her.   Will continue to monitor HR, UOP, Cr, electrolytes.   PT/OT consulted    Patient seen and plan of care discussed with staff.    Lita Miller MD  Cardiology  [facility]

## 2018-06-10 NOTE — ASSESSMENT & PLAN NOTE
Obesity - chronic; will need to discuss weight loss options.  - likely also has obstructive sleep apnea, for which CPAP overnight was very useful. Will place ambulatory consult for Sleep study on discharge & continue CPAP nightly while inpatient.

## 2018-06-10 NOTE — ASSESSMENT & PLAN NOTE
Chronic persistent atrial fibrillation  - pt of Dr. Ferraro. Plan for rate control strategy until pt is euvolemic  - postpone XOCHILT occlusion device w/u as an inpatient  - rate control - s/p IV diltiazem -> PO diltiazem -> switched to PO metoprolol 50mg TID today  - CHADSVASC: 5 suggestive of 7.2% annual stroke risk; will continue Apixaban  - goal K > 4, Mg > 2

## 2018-06-10 NOTE — HOSPITAL COURSE
Pt was admitted to the CCU. She was rate controlled with IV diltiazem and switched over to PO. She was subsequently switched over to PO metoprolol 50mg TID and will be stepped down to hospital [facility] today. Pt's oxygen requirements are decreasing appropriately.

## 2018-06-10 NOTE — ANESTHESIA POSTPROCEDURE EVALUATION
"Anesthesia Post Evaluation    Patient: Sara Grey    Procedure(s) Performed: Procedure(s) (LRB):  TRANSESOPHAGEAL ECHOCARDIOGRAM (SADIE) (N/A)    Final Anesthesia Type: general  Patient location during evaluation: PACU  Patient participation: Yes- Able to Participate  Level of consciousness: awake and alert and oriented  Pain management: adequate  Airway patency: patent  PONV status at discharge: No PONV  Anesthetic complications: no      Cardiovascular status: blood pressure returned to baseline and hemodynamically stable  Respiratory status: unassisted, spontaneous ventilation and room air  Hydration status: euvolemic  Follow-up not needed.        Visit Vitals  /65 (BP Location: Right arm, Patient Position: Lying)   Pulse 103   Temp 36.9 °C (98.5 °F) (Oral)   Resp 18   Ht 5' 3" (1.6 m)   Wt (!) 158.7 kg (349 lb 13.9 oz)   SpO2 98%   Breastfeeding? No   BMI 61.98 kg/m²       Pain/Joanne Score: Pain Assessment Performed: Yes (6/10/2018  7:01 AM)  Presence of Pain: denies (6/10/2018  7:01 AM)      "

## 2018-06-10 NOTE — ASSESSMENT & PLAN NOTE
- acute exacerbation likely due to Afib with RVR  - ECHO 4/18: EF 55-60% with normal diastolic dysfunction  - continue IV lasix 80mg BID; will consider switching to PO lasix tomorrow   - strict I & O  - daily weights  - cardiac diet

## 2018-06-11 VITALS
SYSTOLIC BLOOD PRESSURE: 158 MMHG | HEART RATE: 83 BPM | HEIGHT: 63 IN | DIASTOLIC BLOOD PRESSURE: 82 MMHG | WEIGHT: 293 LBS | RESPIRATION RATE: 24 BRPM | BODY MASS INDEX: 51.91 KG/M2 | OXYGEN SATURATION: 89 % | TEMPERATURE: 98 F

## 2018-06-11 PROBLEM — R09.02 HYPOXEMIA: Status: ACTIVE | Noted: 2018-06-11

## 2018-06-11 LAB
ALBUMIN SERPL BCP-MCNC: 3.2 G/DL
ALP SERPL-CCNC: 89 U/L
ALT SERPL W/O P-5'-P-CCNC: 14 U/L
ANION GAP SERPL CALC-SCNC: 11 MMOL/L
AST SERPL-CCNC: 13 U/L
BASOPHILS # BLD AUTO: 0.02 K/UL
BASOPHILS NFR BLD: 0.2 %
BILIRUB SERPL-MCNC: 0.6 MG/DL
BUN SERPL-MCNC: 19 MG/DL
CALCIUM SERPL-MCNC: 9 MG/DL
CHLORIDE SERPL-SCNC: 100 MMOL/L
CO2 SERPL-SCNC: 28 MMOL/L
CREAT SERPL-MCNC: 1.3 MG/DL
DIFFERENTIAL METHOD: ABNORMAL
EOSINOPHIL # BLD AUTO: 0.2 K/UL
EOSINOPHIL NFR BLD: 2 %
ERYTHROCYTE [DISTWIDTH] IN BLOOD BY AUTOMATED COUNT: 15.2 %
EST. GFR  (AFRICAN AMERICAN): 53.4 ML/MIN/1.73 M^2
EST. GFR  (NON AFRICAN AMERICAN): 46.3 ML/MIN/1.73 M^2
GLUCOSE SERPL-MCNC: 145 MG/DL
HCT VFR BLD AUTO: 41.5 %
HGB BLD-MCNC: 13 G/DL
IMM GRANULOCYTES # BLD AUTO: 0.02 K/UL
IMM GRANULOCYTES NFR BLD AUTO: 0.2 %
LYMPHOCYTES # BLD AUTO: 1.7 K/UL
LYMPHOCYTES NFR BLD: 18.7 %
MAGNESIUM SERPL-MCNC: 2.1 MG/DL
MCH RBC QN AUTO: 27.9 PG
MCHC RBC AUTO-ENTMCNC: 31.3 G/DL
MCV RBC AUTO: 89 FL
MONOCYTES # BLD AUTO: 0.6 K/UL
MONOCYTES NFR BLD: 7.1 %
NEUTROPHILS # BLD AUTO: 6.5 K/UL
NEUTROPHILS NFR BLD: 71.8 %
NRBC BLD-RTO: 0 /100 WBC
PHOSPHATE SERPL-MCNC: 3.4 MG/DL
PLATELET # BLD AUTO: 308 K/UL
PMV BLD AUTO: 11.1 FL
POCT GLUCOSE: 129 MG/DL (ref 70–110)
POCT GLUCOSE: 192 MG/DL (ref 70–110)
POTASSIUM SERPL-SCNC: 3.7 MMOL/L
PROT SERPL-MCNC: 6.7 G/DL
RBC # BLD AUTO: 4.66 M/UL
SODIUM SERPL-SCNC: 139 MMOL/L
WBC # BLD AUTO: 9 K/UL

## 2018-06-11 PROCEDURE — 99232 SBSQ HOSP IP/OBS MODERATE 35: CPT | Mod: ,,, | Performed by: INTERNAL MEDICINE

## 2018-06-11 PROCEDURE — 85025 COMPLETE CBC W/AUTO DIFF WBC: CPT

## 2018-06-11 PROCEDURE — 97161 PT EVAL LOW COMPLEX 20 MIN: CPT

## 2018-06-11 PROCEDURE — 25000003 PHARM REV CODE 250: Performed by: INTERNAL MEDICINE

## 2018-06-11 PROCEDURE — 84100 ASSAY OF PHOSPHORUS: CPT

## 2018-06-11 PROCEDURE — 83735 ASSAY OF MAGNESIUM: CPT

## 2018-06-11 PROCEDURE — 94761 N-INVAS EAR/PLS OXIMETRY MLT: CPT

## 2018-06-11 PROCEDURE — 27000221 HC OXYGEN, UP TO 24 HOURS

## 2018-06-11 PROCEDURE — 25000242 PHARM REV CODE 250 ALT 637 W/ HCPCS: Performed by: STUDENT IN AN ORGANIZED HEALTH CARE EDUCATION/TRAINING PROGRAM

## 2018-06-11 PROCEDURE — 63600175 PHARM REV CODE 636 W HCPCS: Performed by: INTERNAL MEDICINE

## 2018-06-11 PROCEDURE — 80053 COMPREHEN METABOLIC PANEL: CPT

## 2018-06-11 PROCEDURE — 25000003 PHARM REV CODE 250: Performed by: STUDENT IN AN ORGANIZED HEALTH CARE EDUCATION/TRAINING PROGRAM

## 2018-06-11 PROCEDURE — A4216 STERILE WATER/SALINE, 10 ML: HCPCS | Performed by: INTERNAL MEDICINE

## 2018-06-11 PROCEDURE — 97165 OT EVAL LOW COMPLEX 30 MIN: CPT

## 2018-06-11 RX ORDER — METOPROLOL TARTRATE 50 MG/1
50 TABLET ORAL 3 TIMES DAILY
Qty: 90 TABLET | Refills: 2 | Status: ON HOLD | OUTPATIENT
Start: 2018-06-11 | End: 2018-08-16 | Stop reason: SDUPTHER

## 2018-06-11 RX ORDER — ACETAMINOPHEN 325 MG/1
650 TABLET ORAL EVERY 6 HOURS PRN
Status: DISCONTINUED | OUTPATIENT
Start: 2018-06-11 | End: 2018-06-11 | Stop reason: HOSPADM

## 2018-06-11 RX ORDER — HYDRALAZINE HYDROCHLORIDE 50 MG/1
50 TABLET, FILM COATED ORAL 2 TIMES DAILY
Qty: 60 TABLET | Refills: 11 | Status: SHIPPED | OUTPATIENT
Start: 2018-06-11 | End: 2019-06-11

## 2018-06-11 RX ORDER — FUROSEMIDE 40 MG/1
80 TABLET ORAL 2 TIMES DAILY
Status: DISCONTINUED | OUTPATIENT
Start: 2018-06-11 | End: 2018-06-11 | Stop reason: HOSPADM

## 2018-06-11 RX ORDER — ATORVASTATIN CALCIUM 80 MG/1
80 TABLET, FILM COATED ORAL DAILY
Qty: 90 TABLET | Refills: 3 | Status: SHIPPED | OUTPATIENT
Start: 2018-06-11 | End: 2019-06-11

## 2018-06-11 RX ADMIN — APIXABAN 5 MG: 2.5 TABLET, FILM COATED ORAL at 09:06

## 2018-06-11 RX ADMIN — SODIUM CHLORIDE, PRESERVATIVE FREE 3 ML: 5 INJECTION INTRAVENOUS at 07:06

## 2018-06-11 RX ADMIN — LISINOPRIL 2.5 MG: 2.5 TABLET ORAL at 07:06

## 2018-06-11 RX ADMIN — ISOSORBIDE MONONITRATE 30 MG: 30 TABLET, EXTENDED RELEASE ORAL at 07:06

## 2018-06-11 RX ADMIN — METOPROLOL TARTRATE 50 MG: 50 TABLET ORAL at 03:06

## 2018-06-11 RX ADMIN — METOPROLOL TARTRATE 50 MG: 50 TABLET ORAL at 07:06

## 2018-06-11 RX ADMIN — PANTOPRAZOLE SODIUM 40 MG: 40 TABLET, DELAYED RELEASE ORAL at 07:06

## 2018-06-11 RX ADMIN — IPRATROPIUM BROMIDE 1 PUFF: 17 AEROSOL, METERED RESPIRATORY (INHALATION) at 04:06

## 2018-06-11 RX ADMIN — FUROSEMIDE 80 MG: 40 TABLET ORAL at 09:06

## 2018-06-11 RX ADMIN — HYDRALAZINE HYDROCHLORIDE 50 MG: 50 TABLET ORAL at 07:06

## 2018-06-11 RX ADMIN — PRAVASTATIN SODIUM 40 MG: 40 TABLET ORAL at 08:06

## 2018-06-11 RX ADMIN — ACETAMINOPHEN 650 MG: 325 TABLET ORAL at 09:06

## 2018-06-11 NOTE — NURSING
Approx 0920 PT/OT came to work with pt.  They worked with pt in room on O2, sats 91-95% on 2 lpm/humidified; hr in 80's resp rate in high 20's.      O2 was removed and pt was ambulated in hallway approx 25 feet on portable tele.  Pt's sats dropped to 85% and heart rate did increase to low 100's - 110's and resp rate increased to high 30's.  Pt rested briefly in hallway to assure SPO2 waveform was good and capillary pulse correlated with EKG.  Findings as charted on VS flowsheet.      Also, sats were monitored for several minutes after return to room and pt sitting up in BSEC without O2 and the noé remained 88% or below, HR in low 100's and resp rate in mid to high 30's.      NC/humidified was replaced and O2 sats did rise to 90% after several minutes of rest. RR decreased to baseline and HR returned to 80's.  All documented on flowsheet.    These findings were related to the Cards team (Levi Mac and Angela) when they rounded at bedside. Pt appeared to be dyspnic but sats were >90 with 02; abd acces muscles were noted to be in use and resp rate in 30's.  Pt stated it was because she was adjusting position in [facility] while searching for her primary MD phone number and when she readjusted her position, her resp effort did appear to ease as well as rate decreased.  Sats stay low 90's with 02 in place @2lpm/humidified

## 2018-06-11 NOTE — PLAN OF CARE
Patient admitted with atrial fibrillation with rapid ventricular response. Patient currently in the CMICU. She was rate controlled with IV diltiazem and switched over to PO. She was subsequently switched over to PO metoprolol 50mg TID. Plans for patient to step down from CMICU today. According to MD notes- patient's oxygen requirements are decreasing appropriately. Discharge plan pending medical stability. SW and CM will continue to follow for any additional needs. Plan A to discharge home with family support as soon as medically stable. Plan B to discharge home with home health.     PCP: Primary Doctor No    Pharmacy:   [facility] [id number] [place], [place] [address] [facility] [place] [facility] [address] [facility]  [address] [facility] [place] [address]  Phone: [phone] Fax: [phone]    Payor: [employer] MEDICARE / Plan: [employer] (SPECIAL NEEDS PLAN) / Product Type: Medicare Advantage /      06/11/18 1005   Discharge Assessment   Assessment Type Discharge Planning Assessment   Assessment information obtained from? Medical Record   Prior to hospitilization cognitive status: Alert/Oriented   Prior to hospitalization functional status: Independent   Current cognitive status: Alert/Oriented   Current Functional Status: Independent   Lives With significant other   Able to Return to Prior Arrangements yes   Is patient able to care for self after discharge? Yes   Who are your caregiver(s) and their phone number(s)? Rowdy Snowden- significant other [phone]   Patient's perception of discharge disposition home or selfcare   Readmission Within The Last 30 Days no previous admission in last 30 days   Patient currently being followed by outpatient case management? No   Patient currently receives any other outside agency services? No   Equipment Currently Used at Home none   Do you have any problems affording any of your prescribed medications? No   Is the patient taking medications as prescribed?  yes   Does the patient have transportation home? Yes   Transportation Available family or friend will provide   Does the patient receive services at the [facility] Clinic? No   Discharge Plan A Home with family   Discharge Plan B Home Health;Home with family

## 2018-06-11 NOTE — NURSING
After discharge teaching (extensive r/t very minimal knowledge base of her disease processes and meds and after nebulizer was rec'd from pharmacy, pt was brought in  with portable o2 @1lpm in place, to ED entrance by this RN.  Pt/this RN met fiance at ED entrance and pt transferred to car from  without incident.  Due to small space in front seat, O2 tank was strapped into backseat using seatbelt to reduce change of tank moving while driving.  Pt remained stable and had no c/o discomfort nor distress.  D/c BP - manual was 168/82; hr85;sats 91, rr 24.  Pt voiced satisfaction of care and acknowledged discharge instruction jaylon s/s to report to MD.

## 2018-06-11 NOTE — PLAN OF CARE
Pt stepdown status and awaiting a bed still while in ICU. VSS during the night. No s/sx of distress. Pt in high spirits throughout the night. Tolerating her Metoprolol PO well and AFIB well managed thus far. Will continue to monitor the pt.

## 2018-06-11 NOTE — PLAN OF CARE
06/11/2018      Sara Grey  [address] [place] [address]          [facility] Medicine Dept.  [facility] [address] [place] [address]  [phone] after hours  [phone] fax Principal Diagnosis:  Atrial fibrillation with rapid ventricular response    Oxygen Requiring Condition:  Acute hypoxic respiratory failure                          Portable Oxygen / Compressor    Pulse Oximetry:   88 % on 6/11/18 date at rest    Prescribe:  1 Liter/min Nasal Canula continuous    __________________________  Lita Miller MD  06/11/2018

## 2018-06-11 NOTE — PLAN OF CARE
Problem: Occupational Therapy Goal  Goal: Occupational Therapy Goal  Goals to be met by: 6/25/2018    Pt will complete LB dressing with supervision.  Pt will complete toilet transfer with supervision.  Pt will engage in functional mobility to simulate household distances with supervision in order to maximize functional activity tolerance required for engagement with occupations of choice.   Pt will complete supine>sit with HOB flat to seated at EOB with supervision in prep for seated grooming.   Outcome: Ongoing (interventions implemented as appropriate)  OT evaluation completed and POC initiated 6/11/2018. Pt would benefit from HH at d/c in order to maximize safety and independence with functional mobility, transfers, and ADLs upon return to prior living environment.

## 2018-06-11 NOTE — PLAN OF CARE
"Problem: Patient Care Overview  Goal: Discharge Needs Assessment  Home health pt/ot set up with pt's approval to help assure safety and teach safe techniques; assess for needed DME and help if needed to obtain DME/commpplete paperwork/forms.  Pt was in need of much teaching from this RN re: meds and how to follow up especially with sleep study appt; bariatric appt and diabetic educator (her knowledge was very little re: her diabetes management; Hgb A1C test/results/meaning; best food and portions that are best for her; new fluid restrictions; low salt diet (foods to avoid, ex deli/canned foods/frozen dinner/hot dogs/sausage; chinese food...anything with preservatives); taught fluid restrictions (clarified with Dr Miller that pt is to take both lasix and aldactone as ordered for home-was not given the same in the hospital).  CARLOS Riley sw pt before d/c - pt requested [facility] since she is familiar with them from when her mother needed them.  Home o2 concentrator was already in place per pt's report and portable tank had arrived in room approx 1430.  Pt ws in need of teaching re: s/s to report while on Eloquis and how to monitor for s/s bleeding.  Reinforced continuing smoking cessation and offered information of who to call if she starts to develop desire to smoke again. Also stressed to song (who I thought was her ) that no smoking goes for anyone who come into their home not just for "craving" but FOR HOME O2 SAFETY!!!  Both pt and song voiced understanding and danger of open flames/smoking after education by this RN.  Taught and stressed heavily CHF teaching: meds;diet;exercise AND HOW TO WEIGH AND REPORT.  Reinforced MD's education earlier RE: getting a pule ox; self monitoring of BP; sats; pulse and possible resp rate.  Discharge meds were reviewed and next dose indicated.  Attempt was made to make sure pt knew what each med was for and dose/frequency.  Pt aware of where to  meds.  Reviewed " "need to set up "[facility]" and how to use "On Call RN".  Pt voiced understanding and did well with teach back b ut will need much more education re: diabetes/diet/meds.  Pt did not have dye exposure this hospital stay and per MD d/c orders, is to resume her glucophage as ordered - pt voiced understanding of this and need to check CBG before meals and at bedtime.  Pt voiced understanding of this also and states she has not had a low CBG before but will watch for the warning s/s taught by this RN.  Pt was discharged to care of [facility] in stable condition with portable o2 in place @1lpm NC (tank secured in back seat with seatbelt).      "

## 2018-06-11 NOTE — SUBJECTIVE & OBJECTIVE
Interval History:No acute events overnight. Vitals within normal. Pt denies any palpitations, chest pain or SOB. She used CPAP overnight and HR 80-90 bpm on 1L NC    Review of Systems   Constitution: Negative for chills and fever.   HENT: Negative for ear discharge.    Eyes: Negative for pain and visual disturbance.   Cardiovascular: Negative for chest pain, dyspnea on exertion, irregular heartbeat, leg swelling, orthopnea, palpitations, paroxysmal nocturnal dyspnea and syncope.   Respiratory: Negative for hemoptysis, shortness of breath and wheezing.    Skin: Negative for rash and suspicious lesions.   Musculoskeletal: Negative for joint pain and muscle weakness.   Gastrointestinal: Negative for abdominal pain, diarrhea, hematemesis, hematochezia, melena, nausea and vomiting.   Genitourinary: Negative for dysuria and frequency.   Neurological: Negative for focal weakness, headaches and tremors.   Psychiatric/Behavioral: Negative for altered mental status, suicidal ideas and thoughts of violence.     Objective:     Vital Signs (Most Recent):  Temp: 98.4 °F (36.9 °C) (06/11/18 0755)  Pulse: 91 (06/11/18 0830)  Resp: (!) 84 (06/11/18 0830)  BP: 130/81 (06/11/18 0830)  SpO2: (!) 93 % (06/11/18 0830) Vital Signs (24h Range):  Temp:  [98 °F (36.7 °C)-98.7 °F (37.1 °C)] 98.4 °F (36.9 °C)  Pulse:  [] 91  Resp:  [17-89] 84  SpO2:  [92 %-99 %] 93 %  BP: (112-184)/(59-98) 130/81     Weight: (!) 158.7 kg (349 lb 13.9 oz)  Body mass index is 61.98 kg/m².     SpO2: (!) 93 %  O2 Device (Oxygen Therapy): nasal cannula      Intake/Output Summary (Last 24 hours) at 06/11/18 0913  Last data filed at 06/11/18 0800   Gross per 24 hour   Intake              603 ml   Output             1300 ml   Net             -697 ml       Lines/Drains/Airways     Drain            Female External Urinary Catheter 06/08/18 1930 2 days          Peripheral Intravenous Line                 Peripheral IV - Single Lumen 06/08/18 1700 Left Antecubital 2  days              Physical Exam   Constitutional: She is oriented to person, place, and time. She appears well-developed and well-nourished.   HENT:   Head: Normocephalic and atraumatic.   Eyes: EOM are normal.   Cardiovascular: Normal rate.  An irregularly irregular rhythm present. Exam reveals no gallop and no friction rub.    Pulmonary/Chest: Effort normal and breath sounds normal. No stridor. She has no wheezes. She has no rales.   Abdominal: Soft. Bowel sounds are normal. There is no rebound and no guarding.   Musculoskeletal: She exhibits no edema.   Neurological: She is alert and oriented to person, place, and time. No cranial nerve deficit.   Skin: Skin is warm and dry.   Psychiatric: She has a normal mood and affect. Her behavior is normal.     Significant Labs: All pertinent lab results from the last 24 hours have been reviewed.

## 2018-06-11 NOTE — PT/OT/SLP EVAL
Occupational Therapy   Co-Evaluation    Name: Sara Grey  MRN: [id number]  Admitting Diagnosis:  Atrial fibrillation with rapid ventricular response 3 Days Post-Op    Recommendations:     Discharge Recommendations: home with home health  Discharge Equipment Recommendations:  none  Barriers to discharge:  None    History:     Occupational Profile:  Living Environment: Pt lives with daughter and fiance in a SSH with ramp to enter and b/l handrails. Pt has a tub/shower combo.   Previous level of function: PTA, pt was utilizing a SPC PRN and was independent with ADLs.   Roles and Routines: Pt is a fiance and a mother.   Equipment Owned:  cane, straight, rollator, walker, standard (electric scooter)  Assistance upon Discharge: Pt's family is able to assist as needed.     Past Medical History:   Diagnosis Date    Atrial fibrillation     COPD (chronic obstructive pulmonary disease)     CVA (cerebral vascular accident)     Diabetes mellitus     Hypertension        Past Surgical History:   Procedure Laterality Date    HERNIA REPAIR      TRANSESOPHAGEAL ECHOCARDIOGRAPHY N/A 6/8/2018    Procedure: TRANSESOPHAGEAL ECHOCARDIOGRAM (SADIE);  Surgeon: [facility] Provider;  Location: [facility] CATH LAB;  Service: Cardiology;  Laterality: N/A;  AF, SADIE for Rigo Basurto MB, 3 Prep       Subjective     Chief Complaint: SOB/back pain with mobility   Patient/Family stated goals: return to PLOF and living environment  Communicated with: RN (Emily) prior to session. Pt agreeable to therapy evaluation.   Pain/Comfort:  · Pain Rating 1: 0/10  · Pain Addressed 1: Nurse notified, Distraction  · Pain Rating Post-Intervention 1:  (pt reporting back pain upon mobility with PT)    Patients cultural, spiritual, Bahai conflicts given the current situation: none reported     Objective:     Patient found with: telemetry, blood pressure cuff, pulse ox (continuous), oxygen, PureWick    General Precautions: Standard, fall   Orthopedic  Precautions:N/A   Braces: N/A     Occupational Performance:    Bed Mobility:    · NT as pt found seated UIC     Functional Mobility/Transfers:  · Sit<>stand: SBA  · From bedside chair w/o the use of any AD   · Step Toilet transfer: CGA  · Min vc's for utilizing grab bars and for controlled descent   · W/o the use of any AD  · Functional Mobility: Pt engaging in functional mobility to simulate household distances requiring CGA utilizing RW and completed with physical therapist and OT managing lines, portable monitor, and O2 tank.   · Pt reporting back pain and exhibiting SOB  · 02 in tow throughout, however doffed d/t RN testing O2 levels-pt remaining in the high 80's with SOB present and O2 replaced while seated in bedside chair   · RN present     Activities of Daily Living:  · Grooming: SBA  · Standing at sink to brush teeth and wash face   · UB Dressing: setup assistance   · To don back gown while seated UIC  · LB Dressing: CGA  · Pt able to access BLE's via anterior trunk flexion in prep for donning   · CGA required for standing balance     Cognitive/Visual Perceptual:  Pt alert and oriented with good insight into condition and with good motivation to engage in therapy session. Pt able to follow multi-step commands, communicate effectively throughout treatment session, and exhibits intact memory. Pt with impaired safety awareness. No visual deficits present.     Physical Exam:  Balance:  · Sitting: independent supported   · Standing: SBA static; CGA dynamic   Postural Examination: no deviations   Skin Integrity: intact   Edema: none noted   Sensation: intact   UE ROM:  · Right: WFL  · Left: WFL  UE Strength:  · Right: WFL  · Left: WFL   Strength:  · Right: WFL  · Left: WFL  Fine Motor Control: WFL  Gross Motor Control: WFL    Patient left up in chair with all lines intact, call button in reach and RN notified and spouse present.     AMPAC 6 Click:  AMPAC Total Score: 21    Treatment & Education:  -Pt standing  "at sink x3 minutes to brush teeth and wash face with SBA-SOB exhibited and impaired functional activity tolerance   -Communicated OT POC  -Updated communication board  Education:    Assessment:     Sara Grey is a 57 y.o. female with a medical diagnosis of Atrial fibrillation with rapid ventricular response. Performance deficits affecting function are impaired endurance, impaired functional mobilty, impaired self care skills, impaired balance, pain, impaired cardiopulmonary response to activity. Pt requiring SBA for transfers, CGA for functional mobility utilizing RW, CGA for LB Dressing, and setup assistance for grooming. Pt would benefit from HH at d/c in order to maximize independence and safety with ADLs/functional mobility/transfers to ensure safe return to prior living environment.     Rehab Prognosis:  good; patient would benefit from acute skilled OT services to address these deficits and reach maximum level of function.         Clinical Decision Makin.  OT Low:  "Pt evaluation falls under low complexity for evaluation coding due to performance deficits noted in 1-3 areas as stated above and 0 co-morbities affecting current functional status. Data obtained from problem focused assessments. No modifications or assistance was required for completion of evaluation. Only brief occupational profile and history review completed."     Plan:     Patient to be seen 3 x/week to address the above listed problems via self-care/home management, therapeutic activities, therapeutic exercises  · Plan of Care Expires: 18  · Plan of Care Reviewed with: patient, significant other    This Plan of care has been discussed with the patient who was involved in its development and understands and is in agreement with the identified goals and treatment plan    GOALS:    Occupational Therapy Goals        Problem: Occupational Therapy Goal    Goal Priority Disciplines Outcome Interventions   Occupational Therapy Goal "     OT, PT/OT Ongoing (interventions implemented as appropriate)    Description:  Goals to be met by: 6/25/2018    Pt will complete LB dressing with supervision.  Pt will complete toilet transfer with supervision.  Pt will engage in functional mobility to simulate household distances with supervision in order to maximize functional activity tolerance required for engagement with occupations of choice.   Pt will complete supine>sit with HOB flat to seated at EOB with supervision in prep for seated grooming.                     Time Tracking:     OT Date of Treatment: 06/11/18  OT Start Time: 0848  OT Stop Time: 0909  OT Total Time (min): 21 min    Billable Minutes:Evaluation 21 minutes    Myriam العلي OT  6/11/2018

## 2018-06-11 NOTE — DISCHARGE SUMMARY
DISCHARGE SUMMARY  Cardiology    Team: Networked reference to record PCT     Patient Name: Sara Grey  YOB: 1960    Admit Date: 6/8/2018    Discharge Date: 06/11/2018    Discharge Attending Physician: Flora Mac MD     Resident on Service: Lita Miller MD    Chief Complaint: Atrial fibrillation with RVR    Princilpal Diagnoses:  Active Hospital Problems    Diagnosis  POA    *Atrial fibrillation with rapid ventricular response [I48.91]  Yes     Priority: 1 - High     Heart rate 180-200 bpm when she presented for SADIE 6/18      Acute on chronic diastolic (congestive) heart failure [I50.33]  Yes     Priority: 2     Pure hypercholesterolemia [E78.00]  Yes     Priority: 4     Essential hypertension [I10]  Yes     Priority: 4     Cerebrovascular accident (CVA) due to embolism [I63.9]  Yes     Priority: 4      Overview:   6/2017      Diabetes mellitus type 2 in obese [E11.69, E66.9]  Yes     Priority: 5     BMI 60.0-69.9, adult [Z68.44]  Not Applicable     Priority: 9     Hypoxemia [R09.02]  Unknown    AF (atrial fibrillation) [I48.91]  Yes    COPD (chronic obstructive pulmonary disease) [J44.9]  Yes    Snoring [R06.83]  Yes      Resolved Hospital Problems    Diagnosis Date Resolved POA    Persistent atrial fibrillation [I48.1] 06/10/2018 Yes     Priority: 1 - High       Discharged Condition: Admit problems have stabilized      HOSPITAL COURSE:      Initial Presentation:    56 y/o female with PMH of HTN, DM, persistent AF on Eliquis, CVA (6/17 while on Pradaxa with residual speech impairment), COPD, LUCRECIA, and tobacco abuse presenting as an outpatient to Dr. Ferraro for XOCHILT occlusion device considation. The patient was to get a SADIE today, and staff noted stable AF with RVR with HRs in the 180-200s. Her planned CTA was cancelled. She was placed on titratable IV Diltiazem drip and admitted to the Cardiology critical care unit.     Course of Principle Problem for Admission:    Over the  course of the next 2 days, her IV Diltiazem requirement was converted to PO Diltiazem and then to PO metorpolol. She will be sent home on Metoprolol 50mg TID which helped achieve HR  bpm in patient. Given her 7.2% annual stroke risk, we continued her apixaban 5mg BID while she was admitted.    She is being considered candidate for XOCHILT occlusion device by Dr. Ferraro, however will postpone until she is more euvolemic and stable.    Other Medical Problems Addressed in the Hospital:    Acute on chronic congestive heart failure - likely exacerbated by Afib with RVR. After rate control achieved, pt was diuresed with IV lasix 80mg BID for 2 days, then transitioned to PO lasix 80mg BID for 1. Although difficult to assess volume status in the patient because of body habitus, will be send her home with 40mg PO lasix BID with instructions to weigh herself and take extra lasix when her weight increases (3-5 lbs in 24 hours or > 5lbs in 1 week) or she gets short of breath.   - last ECHO 4/18: EF 55-60% with normal diastolic dysfunction (see report below)  - recommend follow up with Dr. Jean Tabor (primary cardiologist in [place]) in 1-2 weeks  [address] [facility] [address], [place] [address]. Phone: [phone]    Acute on chronic hypoxic respiratory failure - likely due to acute heart failure. Pt underwent six-minute walk test and was noted to require 1L O2 to maintain sats > 88%    HLD - continue home atorvastatin 80mg daily  (pt has 2 medications on her MAR - pravastatin 40mg and atorvastatin 80mg)    HTN - continued lisinopril 2.5mg daily, imdur 30mg daily.   - increased hydralazine from 25mg BID to 50mg BID  - increased metoprolol 50mg BID to metoprolol tartrate 50mg TID  - Goal BP < 130/90    Obesity  - encouraged weight loss, including Ambulatory Referral to Bariatric Medicine  continue RF modification, including Afib management  - eduar;uated by PT/OT and home health orders placed    T2DM - A1c 6.1. Held  home metformin & used sliding scale insulin while inpatient. Will resume home meds on discharge.   - encouraged diabetic diet    Physical Exam   Constitutional: She is oriented to person, place, and time. She appears well-developed and well-nourished.   HENT:   Head: Normocephalic and atraumatic.   Eyes: EOM are normal.   Cardiovascular: Normal rate.  An irregularly irregular rhythm present. No murmurs.  Pulmonary/Chest: Effort normal and breath sounds normal. Exam limited by body habitus.  Abdominal: Soft. Bowel sounds are normal. There is no rebound and no guarding.   Musculoskeletal: She exhibits no edema.   Neurological: She is alert and oriented to person, place, and time. No cranial nerve deficit.   Skin: Skin is warm and dry.   Psychiatric: She has a normal mood and affect. Her behavior is normal.     Last CBC/BMP/HgbA1c (if applicable):    Recent Labs  Lab 06/09/18  0355 06/10/18  0508 06/11/18  0401   WBC 11.70 9.73 9.00   HGB 12.6 13.1 13.0   HCT 40.7 41.9 41.5    289 308         Recent Labs  Lab 06/09/18  0355 06/10/18  0508 06/11/18  0401    142 139   K 4.3 3.9 3.7    103 100   CO2 27 29 28   BUN 19 18 19   CREATININE 1.3 1.2 1.3   CALCIUM 9.0 9.1 9.0   PROT 6.9 7.0 6.7   BILITOT 0.6 0.7 0.6   ALKPHOS 99 97 89   ALT 13 12 14   AST 12 10 13     Pertinent/Significant Diagnostic Studies:  N/a    Special Treatments/Procedures: none    Disposition:  Home with Home Health     Follow-up Plans from This Hospitalization:   Follow-up Information     [facility].    Specialty:  Emergency Medicine  Why:  If symptoms worsen, As needed  Contact information:  [address]  [facility] [address] [phone]           Schedule an appointment as soon as possible for a visit with Gabino Tabor MD.    Specialty:  Cardiology  Contact information:  [address] [facility] [address] [phone]                   Discharge Medication List:   Sara Grey  "  Home Medication Instructions SABI:[id number]    Printed on:06/11/18 2757   Medication Information                      apixaban (ELIQUIS) 5 mg Tab  Take 5 mg by mouth 2 (two) times daily.             atorvastatin (LIPITOR) 80 MG tablet  Take 1 tablet (80 mg total) by mouth once daily.             furosemide (LASIX) 40 MG tablet  Take 40 mg by mouth 2 (two) times daily.             hydrALAZINE (APRESOLINE) 50 MG tablet  Take 1 tablet (50 mg total) by mouth 2 (two) times daily.             ibuprofen (ADVIL,MOTRIN) 600 MG tablet  Take 600 mg by mouth every 6 (six) hours as needed for Pain.             insulin glargine (LANTUS) 100 unit/mL injection  Inject 48 Units into the skin every evening.             ipratropium (ATROVENT HFA) 17 mcg/actuation inhaler  Inhale 2 puffs into the lungs 4 (four) times daily as needed for Wheezing. Rescue             ISOSORBIDE MONONITRATE ORAL  Take 30 mg by mouth.             lisinopril (PRINIVIL,ZESTRIL) 2.5 MG tablet  Take 2.5 mg by mouth once daily.             lubiprostone (AMITIZA) 8 MCG Cap  Take 8 mcg by mouth 2 (two) times daily.             metFORMIN (GLUCOPHAGE) 500 MG tablet  Take 500 mg by mouth 2 (two) times daily.             metoprolol tartrate (LOPRESSOR) 50 MG tablet  Take 1 tablet (50 mg total) by mouth 3 (three) times daily.             pantoprazole (PROTONIX) 40 MG tablet  Take 40 mg by mouth once daily.             spironolactone (ALDACTONE) 50 MG tablet  Take 50 mg by mouth 2 (two) times daily.               Patient Instructions:    Discharge Procedure Orders  OXYGEN FOR HOME USE   Order Specific Question Answer Comments   Liter Flow 1    Duration With activity    Qualifying SpO2: 88    Testing done at: Rest    Route nasal cannula    Device home concentrator with portable unit    Patient condition with qualifying saturation CHF Acute hypoxic respiratory failure   Height: 5' 3" (1.6 m)    Weight: 158.7 kg (349 lb 13.9 oz)    Does patient have medical equipment " at home? none    Alternative treatment measures have been tried or considered and deemed clinically ineffective. Yes      Ambulatory consult to Sleep Disorders   Referral Priority: Routine Referral Type: Consultation   Requested Specialty: Sleep Medicine    Number of Visits Requested: 1      Ambulatory Referral to Bariatric Medicine   Referral Priority: Routine Referral Type: Consultation   Referral Reason: Specialty Services Required    Requested Specialty: Bariatrics    Number of Visits Requested: 1      Diet Cardiac     Diet diabetic     Activity as tolerated     Notify your health care provider if you experience any of the following:  temperature >100.4     Notify your health care provider if you experience any of the following:  persistent nausea and vomiting or diarrhea     Notify your health care provider if you experience any of the following:  severe uncontrolled pain     Notify your health care provider if you experience any of the following:  difficulty breathing or increased cough     Notify your health care provider if you experience any of the following:  severe persistent headache     Notify your health care provider if you experience any of the following:  worsening rash     Notify your health care provider if you experience any of the following:  persistent dizziness, light-headedness, or visual disturbances     Notify your health care provider if you experience any of the following:  increased confusion or weakness     At the time of discharge patient was told to take all medications as prescribed, to keep all followup appointments, and to call their primary care physician or return to the emergency room if they have any worsening or concerning symptoms.    Patient seen and plan discussed with Staff.    Signing Physician:    Lita Miller MD  Internal Medicine, PGY-2  Pager: [phone]

## 2018-06-11 NOTE — PLAN OF CARE
Problem: Physical Therapy Goal  Goal: Physical Therapy Goal  Goals to be met by: 2018    Patient will increase functional independence with mobility by performin. Gait  x 250 feet with Supervision using LRAD - not met    Outcome: Ongoing (interventions implemented as appropriate)  Eval completed and goals appropriate.

## 2018-06-11 NOTE — NURSING
0900 meds given early-per report, pt being evaled for response for wither d/c to home or transfer to floor.  0900 meds given early to see BP response as well as others r/t fluid restriction.

## 2018-06-11 NOTE — PLAN OF CARE
[facility]    HOME HEALTH ORDERS  FACE TO FACE ENCOUNTER    Patient Name: Sara Grey  YOB: 1960    PCP: Primary Doctor No   PCP Address: None  PCP Phone Number: None  PCP Fax: None    Encounter Date: 06/11/2018    Admit to Home Health     Diagnoses:  Active Hospital Problems    Diagnosis  POA    *Atrial fibrillation with rapid ventricular response [I48.91]  Yes     Priority: 1 - High     Heart rate 180-200 bpm when she presented for SADIE 6/18      Acute on chronic diastolic (congestive) heart failure [I50.33]  Yes     Priority: 2     Pure hypercholesterolemia [E78.00]  Yes     Priority: 4     Essential hypertension [I10]  Yes     Priority: 4     Cerebrovascular accident (CVA) due to embolism [I63.9]  Yes     Priority: 4      Overview:   6/2017      Diabetes mellitus type 2 in obese [E11.69, E66.9]  Yes     Priority: 5     BMI 60.0-69.9, adult [Z68.44]  Not Applicable     Priority: 9     Hypoxemia [R09.02]  Unknown    AF (atrial fibrillation) [I48.91]  Yes    COPD (chronic obstructive pulmonary disease) [J44.9]  Yes    Snoring [R06.83]  Yes      Resolved Hospital Problems    Diagnosis Date Resolved POA    Persistent atrial fibrillation [I48.1] 06/10/2018 Yes     Priority: 1 - High       No future appointments.    I have seen and examined this patient face to face today. My clinical findings that support the need for the home health skilled services and home bound status are the following:  Weakness/numbness causing balance and gait disturbance due to Heart Failure, COPD Exacerbation and Weakness/Debility making it taxing to leave home.    Allergies:  Review of patient's allergies indicates:   Allergen Reactions    Sulfa (sulfonamide antibiotics) Shortness Of Breath       Diet: cardiac diet and diabetic diet: 2000 calorie    Activities: activity as tolerated    Nursing:   SN to complete comprehensive assessment including routine vital signs. Instruct on disease  process and s/s of complications to report to MD. Review/verify medication list sent home with the patient at time of discharge  and instruct patient/caregiver as needed. Frequency may be adjusted depending on start of care date.    Notify MD if SBP > 160 or < 90; DBP > 90 or < 50; HR > 120 or < 50; Temp > 101      CONSULTS:    Physical Therapy to evaluate and treat. Evaluate for home safety and equipment needs; Establish/upgrade home exercise program. Perform / instruct on therapeutic exercises, gait training, transfer training, and Range of Motion.  Occupational Therapy to evaluate and treat. Evaluate home environment for safety and equipment needs. Perform/Instruct on transfers, ADL training, ROM, and therapeutic exercises.   to evaluate for community resources/long-range planning.  Aide to provide assistance with personal care, ADLs, and vital signs.    MISCELLANEOUS CARE:  Heart Failure:      SN to instruct on the following:    Instruct on the definition of CHF.   Instruct on the signs/sympoms of CHF to be reported.   Instruct on and monitor daily weights.   Instruct on factors that cause exacerbation.   Instruct on action, dose, schedule, and side effects of medications.   Instruct on diet as prescribed.   Instruct on activity allowed.   Instruct on life-style modifications for life long management of CHF   SN to assess compliance with daily weights, diet, medications, fluid retention,    safety precautions, activities permitted and life-style modifications.   Additional 1-2 SN visits per week as needed for signs and symptoms     of CHF exacerbation.      For Weight Gain > 2-3 lbs in 1 day or 4-6 lbs over 1 week notify PCP:  Increase 40mg furosemide twice daily (oral diuretic) dose to 80mg furosemide twice daily for 5 days temporarily     Home Oxygen:  Oxygen at 1 L/min nasal canula to be used:  Continuously.    Medications: Review discharge medications with patient and family and provide  education.      Current Discharge Medication List      CONTINUE these medications which have CHANGED    Details   hydrALAZINE (APRESOLINE) 50 MG tablet Take 1 tablet (50 mg total) by mouth 2 (two) times daily.  Qty: 60 tablet, Refills: 11      metoprolol tartrate (LOPRESSOR) 50 MG tablet Take 1 tablet (50 mg total) by mouth 3 (three) times daily.  Qty: 90 tablet, Refills: 2         CONTINUE these medications which have NOT CHANGED    Details   apixaban (ELIQUIS) 5 mg Tab Take 5 mg by mouth 2 (two) times daily.      furosemide (LASIX) 40 MG tablet Take 40 mg by mouth 2 (two) times daily.      ibuprofen (ADVIL,MOTRIN) 600 MG tablet Take 600 mg by mouth every 6 (six) hours as needed for Pain.      insulin glargine (LANTUS) 100 unit/mL injection Inject 48 Units into the skin every evening.      ipratropium (ATROVENT HFA) 17 mcg/actuation inhaler Inhale 2 puffs into the lungs 4 (four) times daily as needed for Wheezing. Rescue      ISOSORBIDE MONONITRATE ORAL Take 30 mg by mouth.      lisinopril (PRINIVIL,ZESTRIL) 2.5 MG tablet Take 2.5 mg by mouth once daily.      lubiprostone (AMITIZA) 8 MCG Cap Take 8 mcg by mouth 2 (two) times daily.      pantoprazole (PROTONIX) 40 MG tablet Take 40 mg by mouth once daily.      pravastatin (PRAVACHOL) 40 MG tablet Take 40 mg by mouth once daily.      spironolactone (ALDACTONE) 50 MG tablet Take 50 mg by mouth 2 (two) times daily.      metFORMIN (GLUCOPHAGE) 500 MG tablet Take 500 mg by mouth 2 (two) times daily.         STOP taking these medications       atorvastatin (LIPITOR) 80 MG tablet Comments:   Reason for Stopping:               I certify that this patient is confined to her home and needs intermittent skilled nursing care, physical therapy and occupational therapy.      Lita Miller MD  Internal Medicine, PGY-2  [phone]

## 2018-06-11 NOTE — PLAN OF CARE
Referral made to [facility] via [facility].  Oxygen has been ordered per odme.  Pt in agreement with arrangements.  She will return home with her  to [place]

## 2018-06-11 NOTE — PLAN OF CARE
Plans to discharge patient home today with home health and home oxygen. [facility] working on getting patient her home oxygen delivered to bedside.  to set up home health services. SW and CM will continue to follow for any additional needs. Discharge and follow-up instructions to be completed by the bedside nurse.     06/11/18 1315   Final Note   Assessment Type Final Discharge Note   Discharge Disposition Home   What phone number can be called within the next 1-3 days to see how you are doing after discharge? ([phone])   Hospital Follow Up  Appt(s) scheduled? Yes   Discharge plans and expectations educations in teach back method with documentation complete? Yes

## 2018-06-11 NOTE — PT/OT/SLP EVAL
Physical Therapy Evaluation    Patient Name:  Sara Grey   MRN:  [id number]    Co-treat with OT    Recommendations:     Discharge Recommendations:  home with home health   Discharge Equipment Recommendations: none   Barriers to discharge: None    Assessment:     Sara Grey is a 57 y.o. female admitted with a medical diagnosis of Atrial fibrillation with rapid ventricular response.  She presents with the following impairments/functional limitations:  impaired endurance, impaired functional mobilty, gait instability, impaired balance, pain, impaired cardiopulmonary response to activity. Pt SOB with minimal activity. RN took O2 off during ambulation and pt's SpO2 fluctuated between mid 80's to low 90s. RN put O2 back on. Pt would benefit from HH upon d/c to maximize functional mobility, increase endurance, and ensure safety at home.    Rehab Prognosis:  good; patient would benefit from acute skilled PT services to address these deficits and reach maximum level of function.      Recent Surgery: Procedure(s) (LRB):  TRANSESOPHAGEAL ECHOCARDIOGRAM (SADIE) (N/A) 3 Days Post-Op    Plan:     During this hospitalization, patient to be seen 4 x/week to address the above listed problems via gait training, therapeutic activities, therapeutic exercises, neuromuscular re-education  · Plan of Care Expires:  07/09/18   Plan of Care Reviewed with: patient, significant other    Subjective     Communicated with RN prior to session and fiance present in room.  Patient found in bed upon PT entry to room, agreeable to evaluation.      Chief Complaint: SOB/back pain  Patient comments/goals: to return home   Pain/Comfort:  · Pain Rating 1: 0/10  · Pain Rating Post-Intervention 1: 8/10 (back during ambulation )    Patients cultural, spiritual, [employer] conflicts given the current situation: none reported     Living Environment:  Pt lives with fiance and adult daughter in [facility] with ramp access and [address]  Prior to admission, patients  level of function was mod indep with ambulation (SPC, rollator, standard walker, electric scooter; pt used SPC most often as needed for short disances and used electric scooter for long distances).  Patient has the following equipment: cane, straight, bedside commode, rollator, walker, standard (electric scooter).  DME owned (not currently used): none.  Upon discharge, patient will have assistance from family.    Objective:     Patient found with: telemetry, pulse ox (continuous), blood pressure cuff, oxygen, peripheral IV, PureWick     General Precautions: Standard, fall   Orthopedic Precautions:N/A   Braces: N/A     Exams:  · Cognitive Exam:    · AAOx4   · Follows all commands   · Gross Motor Coordination:  WFL; B LE alternating toe taps   · Sensation: -       Intact; B LE light touch  · RLE ROM: WFL  · RLE Strength: WFL  · LLE ROM: WFL  · LLE Strength: WFL    Functional Mobility:  · Bed Mobility: NT 2nd to pt found in chair   · Transfers:     · Sit to Stand:    · Trial 1: SBA using no AD from chair  · Trial 2: SBA using RW from toilet  · Gait:   · ~10ft with CGA using RW to sink  · ~10ft with CGA using RW to toilet  · ~50ft with CGA using RW; portable monitor intact and RN present  · Decreased shalini, decreased step length  · Frequent standing rest breaks 2nd to back pain and SOB   · SpO2 fluctuated between mid 80's- low 90's with no O2; RN present     AM-PAC 6 CLICK MOBILITY  Total Score:20       Therapeutic Activities and Exercises:  Educated pt on PT role/POC  Educated pt on importance of OOB activity  Pt verbalized understanding      Indep with toileting     Patient left up in chair with all lines intact, call button in reach, RN notified and fiance present.    GOALS:    Physical Therapy Goals        Problem: Physical Therapy Goal    Goal Priority Disciplines Outcome Goal Variances Interventions   Physical Therapy Goal     PT/OT, PT Ongoing (interventions implemented as appropriate)     Description:  Goals  to be met by: 2018    Patient will increase functional independence with mobility by performin. Gait  x 250 feet with Supervision using LRAD - not met                      History:     Past Medical History:   Diagnosis Date    Atrial fibrillation     COPD (chronic obstructive pulmonary disease)     CVA (cerebral vascular accident)     Diabetes mellitus     Hypertension        Past Surgical History:   Procedure Laterality Date    HERNIA REPAIR      TRANSESOPHAGEAL ECHOCARDIOGRAPHY N/A 2018    Procedure: TRANSESOPHAGEAL ECHOCARDIOGRAM (SADIE);  Surgeon: Chinyere Diagnostic Provider;  Location: [facility] CATH LAB;  Service: Cardiology;  Laterality: N/A;  AF, SADIE for Rigo Basurto MB, 3 Prep       Time Tracking:     PT Received On: 18  PT Start Time: 0849     PT Stop Time: 0911  PT Total Time (min): 22 min     Billable Minutes: Evaluation 15    Abi Ortiz PT, DPT  2018  [phone]

## 2018-06-14 DIAGNOSIS — I48.0 PAF (PAROXYSMAL ATRIAL FIBRILLATION): ICD-10-CM

## 2018-06-14 DIAGNOSIS — I48.91 ATRIAL FIBRILLATION BY ELECTROCARDIOGRAM: Primary | ICD-10-CM

## 2018-06-14 DIAGNOSIS — R94.31 ABNORMAL EKG: Primary | ICD-10-CM

## 2018-06-14 DIAGNOSIS — I48.91 A-FIB: ICD-10-CM

## 2018-06-14 DIAGNOSIS — I48.92 ATRIAL FLUTTER: ICD-10-CM

## 2018-06-15 NOTE — PT/OT/SLP DISCHARGE
Physical Therapy Discharge Summary    Name: Sara Grey  MRN: [id number]   Principal Problem: Atrial fibrillation with rapid ventricular response     Patient Discharged from acute Physical Therapy on 2018.  Please refer to prior PT noted date on 2018 for functional status.     Assessment:     Patient was discharged unexpectedly.  Information required to complete an accurate discharge summary is unknown.  Refer to therapy initial evaluation and last progress note for initial and most recent functional status and goal achievement.  Recommendations made may be found in medical record.    Objective:     GOALS:    Physical Therapy Goals        Problem: Physical Therapy Goal    Goal Priority Disciplines Outcome Goal Variances Interventions   Physical Therapy Goal     PT/OT, PT Ongoing (interventions implemented as appropriate)     Description:  Goals to be met by: 2018    Patient will increase functional independence with mobility by performin. Gait  x 250 feet with Supervision using LRAD - not met                      Reasons for Discontinuation of Therapy Services  Transfer to alternate level of care.      Plan:     Patient Discharged to: Home with Home Health Service.    Abi Ortiz, PT, DPT  6/15/2018  [phone]

## 2018-06-18 NOTE — PT/OT/SLP DISCHARGE
Occupational Therapy Discharge Summary    Sara Grey  MRN: [id number]   Principal Problem: Atrial fibrillation with rapid ventricular response      Patient Discharged from acute Occupational Therapy on 6/11/2018.  Please refer to prior OT note dated 6/11/2018 for functional status.    Assessment:      Patient appropriate for care in another setting.    Objective:     GOALS:    Occupational Therapy Goals        Problem: Occupational Therapy Goal    Goal Priority Disciplines Outcome Interventions   Occupational Therapy Goal     OT, PT/OT Ongoing (interventions implemented as appropriate)    Description:  Goals to be met by: 6/25/2018    Pt will complete LB dressing with supervision.  Pt will complete toilet transfer with supervision.  Pt will engage in functional mobility to simulate household distances with supervision in order to maximize functional activity tolerance required for engagement with occupations of choice.   Pt will complete supine>sit with HOB flat to seated at EOB with supervision in prep for seated grooming.                     Reasons for Discontinuation of Therapy Services  Transfer to alternate level of care.      Plan:     Patient Discharged to: Home with Home Health Service    Myriam العلي, OT  6/18/2018

## 2018-08-15 ENCOUNTER — TELEPHONE (OUTPATIENT)
Dept: ELECTROPHYSIOLOGY | Facility: CLINIC | Age: 58
End: 2018-08-15

## 2018-08-15 DIAGNOSIS — I48.91 ATRIAL FIBRILLATION: Primary | ICD-10-CM

## 2018-08-15 DIAGNOSIS — I48.91 AF (ATRIAL FIBRILLATION): ICD-10-CM

## 2018-08-15 DIAGNOSIS — I50.33 ACUTE ON CHRONIC DIASTOLIC (CONGESTIVE) HEART FAILURE: ICD-10-CM

## 2018-08-15 DIAGNOSIS — I48.0 PAROXYSMAL A-FIB: ICD-10-CM

## 2018-08-15 DIAGNOSIS — I48.0 PAROXYSMAL ATRIAL FIBRILLATION: Primary | ICD-10-CM

## 2018-08-15 DIAGNOSIS — R94.31 NONSPECIFIC ABNORMAL ELECTROCARDIOGRAM (ECG) (EKG): ICD-10-CM

## 2018-08-15 NOTE — PROGRESS NOTES
Call made to patient concerning scheduled SADIE appointment. Call went to voicemail. Number given to call me at [phone] for instructions and questions. I informed the patient that they are to be NPO after midnight and that they can take their medications with a small sip of water.

## 2018-08-16 ENCOUNTER — HOSPITAL ENCOUNTER (OUTPATIENT)
Dept: CARDIOLOGY | Facility: CLINIC | Age: 58
Discharge: HOME | End: 2018-08-16
Attending: INTERNAL MEDICINE | Admitting: INTERNAL MEDICINE
Payer: MEDICARE

## 2018-08-16 ENCOUNTER — ANESTHESIA (OUTPATIENT)
Dept: MEDSURG UNIT | Facility: HOSPITAL | Age: 58
End: 2018-08-16
Payer: MEDICARE

## 2018-08-16 ENCOUNTER — TELEPHONE (OUTPATIENT)
Dept: CARDIOLOGY | Facility: CLINIC | Age: 58
End: 2018-08-16

## 2018-08-16 ENCOUNTER — HOSPITAL ENCOUNTER (OUTPATIENT)
Facility: HOSPITAL | Age: 58
Discharge: HOME | End: 2018-08-16
Attending: INTERNAL MEDICINE | Admitting: INTERNAL MEDICINE
Payer: MEDICARE

## 2018-08-16 ENCOUNTER — ANESTHESIA EVENT (OUTPATIENT)
Dept: MEDSURG UNIT | Facility: HOSPITAL | Age: 58
End: 2018-08-16
Payer: MEDICARE

## 2018-08-16 VITALS
HEART RATE: 108 BPM | HEIGHT: 63 IN | DIASTOLIC BLOOD PRESSURE: 100 MMHG | WEIGHT: 293 LBS | TEMPERATURE: 98 F | OXYGEN SATURATION: 95 % | BODY MASS INDEX: 51.91 KG/M2 | RESPIRATION RATE: 20 BRPM | SYSTOLIC BLOOD PRESSURE: 195 MMHG

## 2018-08-16 DIAGNOSIS — I48.0 PAROXYSMAL ATRIAL FIBRILLATION: Primary | ICD-10-CM

## 2018-08-16 DIAGNOSIS — I48.91 ATRIAL FIBRILLATION: ICD-10-CM

## 2018-08-16 LAB — POCT GLUCOSE: 332 MG/DL (ref 70–110)

## 2018-08-16 PROCEDURE — 93005 ELECTROCARDIOGRAM TRACING: CPT

## 2018-08-16 PROCEDURE — D9220A PRA ANESTHESIA: Mod: ANES,,, | Performed by: ANESTHESIOLOGY

## 2018-08-16 PROCEDURE — 37000008 HC ANESTHESIA 1ST 15 MINUTES

## 2018-08-16 PROCEDURE — 93010 ELECTROCARDIOGRAM REPORT: CPT | Mod: ,,, | Performed by: INTERNAL MEDICINE

## 2018-08-16 PROCEDURE — 63600175 PHARM REV CODE 636 W HCPCS: Performed by: NURSE ANESTHETIST, CERTIFIED REGISTERED

## 2018-08-16 PROCEDURE — D9220A PRA ANESTHESIA: Mod: CRNA,,, | Performed by: NURSE ANESTHETIST, CERTIFIED REGISTERED

## 2018-08-16 PROCEDURE — 82962 GLUCOSE BLOOD TEST: CPT

## 2018-08-16 PROCEDURE — 25000003 PHARM REV CODE 250: Performed by: NURSE ANESTHETIST, CERTIFIED REGISTERED

## 2018-08-16 PROCEDURE — 37000009 HC ANESTHESIA EA ADD 15 MINS

## 2018-08-16 RX ORDER — METOPROLOL TARTRATE 50 MG/1
100 TABLET ORAL 2 TIMES DAILY
Qty: 360 TABLET | Refills: 0 | Status: SHIPPED | OUTPATIENT
Start: 2018-08-16 | End: 2019-05-23

## 2018-08-16 RX ORDER — SODIUM CHLORIDE 9 MG/ML
INJECTION, SOLUTION INTRAVENOUS CONTINUOUS PRN
Status: DISCONTINUED | OUTPATIENT
Start: 2018-08-16 | End: 2018-08-16 | Stop reason: HOSPADM

## 2018-08-16 RX ORDER — LISINOPRIL 5 MG/1
5 TABLET ORAL DAILY
Qty: 90 TABLET | Refills: 0 | Status: SHIPPED | OUTPATIENT
Start: 2018-08-16 | End: 2018-10-29

## 2018-08-16 RX ORDER — MIDAZOLAM HYDROCHLORIDE 1 MG/ML
INJECTION, SOLUTION INTRAMUSCULAR; INTRAVENOUS
Status: DISCONTINUED | OUTPATIENT
Start: 2018-08-16 | End: 2018-08-16 | Stop reason: HOSPADM

## 2018-08-16 RX ORDER — FENTANYL CITRATE 50 UG/ML
25 INJECTION, SOLUTION INTRAMUSCULAR; INTRAVENOUS EVERY 5 MIN PRN
Status: CANCELLED | OUTPATIENT
Start: 2018-08-16

## 2018-08-16 RX ORDER — DIPHENHYDRAMINE HYDROCHLORIDE 50 MG/ML
25 INJECTION INTRAMUSCULAR; INTRAVENOUS EVERY 6 HOURS PRN
Status: CANCELLED | OUTPATIENT
Start: 2018-08-16

## 2018-08-16 RX ADMIN — SODIUM CHLORIDE: 0.9 INJECTION, SOLUTION INTRAVENOUS at 01:08

## 2018-08-16 RX ADMIN — MIDAZOLAM 2 MG: 1 INJECTION INTRAMUSCULAR; INTRAVENOUS at 01:08

## 2018-08-16 RX ADMIN — TOPICAL ANESTHETIC 1 EACH: 200 SPRAY DENTAL; PERIODONTAL at 01:08

## 2018-08-16 NOTE — H&P
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 The patient was admitted for 2 days in June for AF with RVR. She was scheduled to have an outpatient SADIE at that time, was noted to have HR >150, SADIE was aborted, and she was admitted to the CCU for rate-control and diuresis. The patient denies CP and SOB. The patient is also on ASA.     TTE 4/2018    1 - Concentric hypertrophy.     2 - No wall motion abnormalities.     3 - Normal left ventricular systolic function (EF 55-60%).     4 - Normal left ventricular diastolic function.     5 - Low normal to mildly depressed right ventricular systolic function .     6 - The estimated PA systolic pressure is greater than 20 mmHg.     Dysphagia or odynophagia:  No  Liver Disease, esophageal disease, or known varices:  No  Upper GI Bleeding: No  Snoring:  Yes  Sleep Apnea:  Yes  Prior neck surgery or radiation:  No  History of anesthetic difficulties:  No  Family history of anesthetic difficulties:  No  Last oral intake:  12 hours ago  Able to move neck in all directions:  Yes      Meds:     Scheduled Meds:  PRN Meds:  Continuous Infusions:    Physical Exam:     Vitals:          Constitutional: NAD, conversant  HEENT:   Sclera anicteric, Uvula midline, EOMI, OP clear  Neck:               No JVD, moves to all direction without any limitations  CV:               Irregularly, irregular, tachycardic, no murmurs / rubs / gallops, normal S1/S2  Pulm:               CTAB, no wheezes, rales, or ronchi  GI:               Abdomen soft, NTND, +BS  Extremities:              No LE edema, warm with palpable pulses  Neuro:    AAOX3, no focal motor deficits    Mallampati: 3  ASA: 3      Labs:     18: Hgb 13, Plt 308K    EK2018  AF with  BPM          Assessment & Plan:     PLAN:  1. SADIE for evaluation of XOCHILT prior to planned Watchman.     -The risks, benefits & alternatives of the procedure were explained to the patient.    -The risks of transesophageal echo include but are not limited to:  Dental trauma, esophageal trauma/perforation, bleeding, laryngospasm/brochospasm, aspiration, sore throat/hoarseness, & dislodgement of the endotracheal tube/nasogastric tube (where applicable).    -The risks of moderate sedation include hypotension, respiratory depression, arrhythmias, bronchospasm, & death.    -Informed consent was obtained & the patient is agreeable to proceed with the procedure.    I will discuss with the attending physician. Attending addendum is to follow.     Further recommendations per attending addendum    Kiran Vences MD

## 2018-08-16 NOTE — NURSING
IV d/c'd with cath tip intact.  Pt and spouse understand discharge instructions.  IV d/c'd with cath tip intact.  Off unit via pt own electric scooter with spouse at side.

## 2018-08-16 NOTE — TRANSFER OF CARE
"Anesthesia Transfer of Care Note    Patient: Sara Grey    Procedure(s) Performed: Procedure(s) (LRB):  ECHOCARDIOGRAM,TRANSESOPHAGEAL (N/A)    Patient location: PACU    Anesthesia Type: MAC    Transport from OR: Transported from OR on room air with adequate spontaneous ventilation    Post pain: adequate analgesia    Post assessment: no apparent anesthetic complications    Post vital signs: stable    Level of consciousness: awake, alert and oriented    Nausea/Vomiting: no nausea/vomiting    Complications: none    Transfer of care protocol was followed      Last vitals:   Visit Vitals  BP (!) 188/89 (BP Location: Left arm, Patient Position: Lying)   Pulse 110   Temp 36.5 °C (97.7 °F) (Oral)   Resp 16   Ht 5' 3" (1.6 m)   Wt (!) 160.1 kg (353 lb)   SpO2 (!) 92%   Breastfeeding? No   BMI 62.53 kg/m²     "

## 2018-08-16 NOTE — ANESTHESIA PREPROCEDURE EVALUATION
08/16/2018  Sara Grey is a 57 y.o., female.  Patient Active Problem List   Diagnosis    Cerebrovascular accident (CVA) due to embolism    Diabetes mellitus type 2 in obese    Essential hypertension    Pure hypercholesterolemia    At high risk for  bleeding    Acute on chronic diastolic (congestive) heart failure    Snoring    Atrial fibrillation with rapid ventricular response    BMI 60.0-69.9, adult    COPD (chronic obstructive pulmonary disease)    AF (atrial fibrillation)    Hypoxemia    Atrial fibrillation         Anesthesia Evaluation         Review of Systems      Physical Exam  General:  Well nourished, Obesity    Airway/Jaw/Neck:  Airway Findings: Mouth Opening: Normal Tongue: Normal  General Airway Assessment: Adult  Mallampati: III  Improves to III with phonation.  TM Distance: Normal, at least 6 cm      Dental:  Dental Findings: Edentulous   Chest/Lungs:  Chest/Lungs Findings: Clear to auscultation     Heart/Vascular:  Heart Findings: Rate: Tachycardia  Rhythm: Irregularly Irregular  Sounds: Normal        Mental Status:  Mental Status Findings:  Cooperative, Alert and Oriented         Anesthesia Plan  Type of Anesthesia, risks & benefits discussed:  Anesthesia Type:  general  Patient's Preference: General  Intra-op Monitoring Plan: standard ASA monitors  Intra-op Monitoring Plan Comments: Standard ASA monitors.   Post Op Pain Control Plan: per primary service following discharge from PACU  Post Op Pain Control Plan Comments: Per primary service.     Induction:   IV  Beta Blocker:  Patient is not currently on a Beta-Blocker (No further documentation required).       Informed Consent: Patient understands risks and agrees with Anesthesia plan.  Questions answered. Anesthesia consent signed with patient.  ASA Score: 3     Day of Surgery Review of History & Physical:    H&P update  referred to the surgeon.     Anesthesia Plan Notes: Chart reviewed, patient interviewed and examined.  The plan for general anesthesia was explained.  Questions were answered and the consent was signed.  Laurie RICHARD         Ready For Surgery From Anesthesia Perspective.

## 2018-08-16 NOTE — DISCHARGE SUMMARY
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 The patient was admitted for 2 days in June for AF with RVR. She was scheduled to have an outpatient SADIE at that time, was noted to have HR >150, SADIE was aborted, and she was admitted to the CCU for rate-control and diuresis. The patient denies CP and SOB. The patient is also on ASA.      TTE 4/2018    1 - Concentric hypertrophy.     2 - No wall motion abnormalities.     3 - Normal left ventricular systolic function (EF 55-60%).     4 - Normal left ventricular diastolic function.     5 - Low normal to mildly depressed right ventricular systolic function .     6 - The estimated PA systolic pressure is greater than 20 mmHg.         Procedure(s) (LRB):  ECHOCARDIOGRAM,TRANSESOPHAGEAL (N/A)     Indwelling Lines/Drains at time of discharge:  Lines/Drains/Airways          None          Hospital Course   The SADIE was cancelled because of severe asymptomatic HTN. The patient also had a paroxysm of RVR with HR >150 BPM with minimal activity prior to starting the SADIE in the SADIE suite. Dr. Alva and Dr. Short discussed the case. A message was sent to Dr. Tabor and Dr. Ferraro. PO metoprolol was increased upon discharge as was her lisinopril. The patient is agreeable and safe for discharge with outpatient f/u with Dr. Castillo (PCP), Dr. Tabor  (general cardiologist), and Dr. Ferraro (EP).    Consults: None    Significant Diagnostic Studies: None    Pending Diagnostic Studies:     None          Final Active Diagnoses:    Diagnosis Date Noted POA    Atrial fibrillation [I48.91] 08/16/2018 Yes      Problems Resolved During this Admission:       Discharged Condition: good    Follow Up:  Follow-up Information     Primary Doctor No.           Ovi Ferraro MD.    Specialties:  Electrophysiology, Cardiovascular Disease  Contact information:  [address] [place] [id number]  [phone]                 Patient Instructions:      Diet Cardiac     Diet diabetic     Notify your health care provider if you experience any of the following:  temperature >100.4     Notify your health care provider if you experience any of the following:  persistent nausea and vomiting or diarrhea     Notify your health care provider if you experience any of the following:  severe uncontrolled pain     Notify your health care provider if you experience any of the following:  redness, tenderness, or signs of infection (pain, swelling, redness, odor or green/yellow discharge around incision site)     Notify your health care provider if you experience any of the following:  severe persistent headache     Notify your health care provider if you experience any of the following:  difficulty breathing or increased cough     Notify your health care provider if you experience any of the following:  worsening rash     Notify your health care provider if you experience any of the following:  persistent dizziness, light-headedness, or visual disturbances     Notify your health care provider if you experience any of the following:  increased confusion or weakness     Notify your health care provider if you experience any of the following:     Activity as tolerated     Medications:  Reconciled Home Medications:      Medication List      CHANGE how you take these medications     lisinopril 5 MG tablet  Commonly known as:  PRINIVIL,ZESTRIL  Take 1 tablet (5 mg total) by mouth once daily.  What changed:    · medication strength  · how much to take     metoprolol tartrate 50 MG tablet  Commonly known as:  LOPRESSOR  Take 2 tablets (100 mg total) by mouth 2 (two) times daily.  What changed:    · how much to take  · when to take this        CONTINUE taking these medications    atorvastatin 80 MG tablet  Commonly known as:  LIPITOR  Take 1 tablet (80 mg total) by mouth once daily.     ELIQUIS 5 mg Tab  Generic drug:  apixaban  Take 5 mg by mouth 2 (two) times daily.     furosemide 40 MG tablet  Commonly known as:  LASIX  Take 40 mg by mouth 2 (two) times daily.     hydrALAZINE 50 MG tablet  Commonly known as:  APRESOLINE  Take 1 tablet (50 mg total) by mouth 2 (two) times daily.     ibuprofen 600 MG tablet  Commonly known as:  ADVIL,MOTRIN  Take 600 mg by mouth every 6 (six) hours as needed for Pain.     insulin glargine 100 unit/mL injection  Commonly known as:  LANTUS  Inject 48 Units into the skin every evening.     ipratropium 17 mcg/actuation inhaler  Commonly known as:  ATROVENT HFA  Inhale 2 puffs into the lungs 4 (four) times daily as needed for Wheezing. Rescue     ISOSORBIDE MONONITRATE ORAL  Take 30 mg by mouth.     lubiprostone 8 MCG Cap  Commonly known as:  AMITIZA  Take 8 mcg by mouth 2 (two) times daily.     metFORMIN 500 MG tablet  Commonly known as:  GLUCOPHAGE  Take 500 mg by mouth 2 (two) times daily.     pantoprazole 40 MG tablet  Commonly known as:  PROTONIX  Take 40 mg by mouth once daily.     spironolactone 50 MG tablet  Commonly known as:  ALDACTONE  Take 50 mg by mouth 2 (two) times daily.            Disposition: Home    Kiran Vences MD  Cardiology  [facility]

## 2018-08-16 NOTE — PLAN OF CARE
Problem: Patient Care Overview  Goal: Plan of Care Review  Outcome: Ongoing (interventions implemented as appropriate)  Received report from AVILA Aldrich, AAOx3. VSS, no c/o pain or discomfort at this time, resp even and unlabored.Post procedure protocol reviewed with patient and patient's family. Understanding verbalized. Family members at bedside. Nurse call bell within reach. Will continue to monitor per post procedure protocol.

## 2018-08-21 ENCOUNTER — TELEPHONE (OUTPATIENT)
Dept: ELECTROPHYSIOLOGY | Facility: CLINIC | Age: 58
End: 2018-08-21

## 2018-08-21 NOTE — TELEPHONE ENCOUNTER
----- Message from Armando Bey sent at 8/21/2018 10:05 AM CDT -----  Thanks  ----- Message -----  From: Noris Koch RN  Sent: 8/21/2018   9:57 AM  To: Armando Bye    We are trying to do the SADIE, but her heart rate and BP were too high. Meds were adjusted. Will try to reschedule SADIE again. The plan is to definitely get SADIE prior to procedure.   ----- Message -----  From: Carmen Wyman MA  Sent: 8/21/2018   9:46 AM  To: Noris Koch RN        ----- Message -----  From: Armando Bey  Sent: 8/21/2018   9:40 AM  To: Ronan Momin is needing to know if the Dr is willing to perform the SADIE before pt has surgery?   If not, this case will go to the Medical Director and possible Peer to Peer.   Please advise.      Thanks

## 2018-08-23 ENCOUNTER — TELEPHONE (OUTPATIENT)
Dept: ELECTROPHYSIOLOGY | Facility: CLINIC | Age: 58
End: 2018-08-23

## 2018-08-23 NOTE — PROGRESS NOTES
LEFT ATRIAL APPENDAGE CLOSURE INSTRUCTIONS    8/30/2018 - Lab work at 2pm  PRE-PROCEDURE LABS HAVE BEEN SCHEDULED FOR YOU @ [facility]  PLEASE ARRIVE AT SCHEDULED TIME FOR THIS LAB WORK.  YOU DO NOT HAVE TO FAST FOR THIS LAB WORK!      8/31/2018 @ 11am  PLEASE REPORT TO THE CARDIOLOGY WAITING AREA ON THE 3RD FLOOR OF THE HOSPITAL     WASH WITH HIBICLENS OR AN ANTIBACTERIAL SOAP (SUCH AS DIAL) ON THE NIGHT BEFORE AND THE MORNING OF YOUR PROCEDURE  PLEASE DO NOT WEAR MAKEUP (ESPECIALLY MASCARA) WHEN ARRIVING FOR YOUR PROCEDURE    DO NOT EAT OR DRINK ANYTHING AFTER: 12 MN ON THE NIGHT BEFORE YOUR PROCEDURE    SOMEONE FROM THE ECHO DEPT WILL BE CALLING YOU ABOUT YOUR TRANSESOPHAGEAL ECHO (THIS WILL BE DONE DURING YOUR PROCEDURE)    MEDICATIONS:  Hold Eliquis on morning of procedure only  YOU MAY TAKE YOUR OTHER USUAL MORNING MEDICATIONS WITH A SIP OF WATER    DIRECTIONS TO CARDIOLOGY WAITING AREA  If you park in the Parking Garage:  Take elevators to the 2nd floor  Walk up ramp and turn right by Gold Elevators  Take elevator to the 3rd floor  Upon exiting the elevator, turn away from the clinic areas  Walk long ibanez around to front of hospital to area with windows overlooking [facility]  Check in at Reception Desk  OR  If family is dropping you off:  Have them drop you off at the front of the Hospital  (Near the ER, where all the flags are hung).  Take the E elevators to the 3rd floor.  Check in at the Reception Desk in the waiting room.        YOU WILL BE SPENDING AT LEAST ONE NIGHT AFTER YOUR PROCEDURE  YOU WILL NEED SOMEONE TO DRIVE YOU HOME AFTER DISCHARGE    YOUR PAIN DURING YOUR PROCEDURE WILL BE ADDRESSED BY THE ANESTHESIA STAFF    Any need to reschedule or cancel procedures, or any questions regarding your procedures should be addressed directly with the Arrhythmia Department Nurses at the following phone number: [phone]

## 2018-08-23 NOTE — TELEPHONE ENCOUNTER
Left message for patient advising that Dr Ferraro wants to move forward with scheduling the Watchman on 8/31/18. He will do her SADIE on day of procedure. Instructions mailed and call back # left.

## 2018-08-24 ENCOUNTER — TELEPHONE (OUTPATIENT)
Dept: ELECTROPHYSIOLOGY | Facility: CLINIC | Age: 58
End: 2018-08-24

## 2018-08-24 NOTE — TELEPHONE ENCOUNTER
----- Message from Isabella Dobbs MA sent at 8/24/2018  4:57 PM CDT -----  Contact: pt      ----- Message -----  From: Desiree Quezada  Sent: 8/24/2018   4:53 PM  To: Ronan Dior Staff    Pls call pt at [phone].  She wants to see if she can stay overnight after her procedure next week because she can't get a hotel room.    Thank you

## 2018-08-24 NOTE — TELEPHONE ENCOUNTER
Spoke with patient. To avoid the expense of a hotel room, she will come in a little early and do labs on day of procedure. Will cancel lab appt. She will be here for 7am on 8/31/2018.

## 2018-08-30 ENCOUNTER — TELEPHONE (OUTPATIENT)
Dept: ELECTROPHYSIOLOGY | Facility: CLINIC | Age: 58
End: 2018-08-30

## 2018-08-30 NOTE — TELEPHONE ENCOUNTER
Spoke to patient      CONFIRMED procedure arrival time of 7am (coming early to get labs in [facility]U)  Reiterated instructions including:  -Directions to check in desk  -Instructed to take 1/2 dose of insulin this evening  -NPO after midnight night prior to procedure  -High importance of HOLDING Eliquis and Metformin tomorrow morning ; take all other meds with small amount of water  -Do not wear mascara day of procedure  -Reminded patient that she will be spending the night       Pt verbalizes understanding of above and appreciates call.

## 2018-08-31 ENCOUNTER — ANESTHESIA (OUTPATIENT)
Dept: MEDSURG UNIT | Facility: HOSPITAL | Age: 58
End: 2018-08-31
Payer: MEDICARE

## 2018-08-31 ENCOUNTER — ANESTHESIA EVENT (OUTPATIENT)
Dept: MEDSURG UNIT | Facility: HOSPITAL | Age: 58
End: 2018-08-31
Payer: MEDICARE

## 2018-08-31 ENCOUNTER — HOSPITAL ENCOUNTER (OUTPATIENT)
Dept: CARDIOLOGY | Facility: CLINIC | Age: 58
Discharge: HOME | End: 2018-08-31
Attending: INTERNAL MEDICINE | Admitting: INTERNAL MEDICINE
Payer: MEDICARE

## 2018-08-31 ENCOUNTER — HOSPITAL ENCOUNTER (OUTPATIENT)
Facility: HOSPITAL | Age: 58
LOS: 1 days | Discharge: HOME | End: 2018-09-01
Attending: INTERNAL MEDICINE | Admitting: INTERNAL MEDICINE
Payer: MEDICARE

## 2018-08-31 DIAGNOSIS — I48.19 PERSISTENT ATRIAL FIBRILLATION: Primary | ICD-10-CM

## 2018-08-31 DIAGNOSIS — I48.19 ATRIAL FIBRILLATION, PERSISTENT: ICD-10-CM

## 2018-08-31 DIAGNOSIS — I63.9 CEREBRAL INFARCTION: ICD-10-CM

## 2018-08-31 LAB
ABO + RH BLD: NORMAL
ANION GAP SERPL CALC-SCNC: 11 MMOL/L
AORTIC ATHEROMA: YES
APTT BLDCRRT: 23 SEC
BASOPHILS # BLD AUTO: 0.01 K/UL
BASOPHILS NFR BLD: 0.1 %
BLD GP AB SCN CELLS X3 SERPL QL: NORMAL
BUN SERPL-MCNC: 20 MG/DL
CALCIUM SERPL-MCNC: 9.1 MG/DL
CHLORIDE SERPL-SCNC: 103 MMOL/L
CO2 SERPL-SCNC: 26 MMOL/L
CREAT SERPL-MCNC: 1.2 MG/DL
DIASTOLIC DYSFUNCTION: NO
DIFFERENTIAL METHOD: ABNORMAL
EOSINOPHIL # BLD AUTO: 0.2 K/UL
EOSINOPHIL NFR BLD: 1.3 %
ERYTHROCYTE [DISTWIDTH] IN BLOOD BY AUTOMATED COUNT: 15.9 %
EST. GFR  (AFRICAN AMERICAN): 58.8 ML/MIN/1.73 M^2
EST. GFR  (NON AFRICAN AMERICAN): 51 ML/MIN/1.73 M^2
GLOBAL PERICARDIAL EFFUSION: ABNORMAL
GLUCOSE SERPL-MCNC: 311 MG/DL
HCT VFR BLD AUTO: 35.3 %
HGB BLD-MCNC: 10.9 G/DL
IMM GRANULOCYTES # BLD AUTO: 0.04 K/UL
IMM GRANULOCYTES NFR BLD AUTO: 0.3 %
INR PPP: 1.1
LYMPHOCYTES # BLD AUTO: 0.9 K/UL
LYMPHOCYTES NFR BLD: 7.1 %
MCH RBC QN AUTO: 27.2 PG
MCHC RBC AUTO-ENTMCNC: 30.9 G/DL
MCV RBC AUTO: 88 FL
MITRAL VALVE MOBILITY: NORMAL
MITRAL VALVE REGURGITATION: ABNORMAL
MONOCYTES # BLD AUTO: 0.5 K/UL
MONOCYTES NFR BLD: 4 %
NEUTROPHILS # BLD AUTO: 10.7 K/UL
NEUTROPHILS NFR BLD: 87.2 %
NRBC BLD-RTO: 0 /100 WBC
PLATELET # BLD AUTO: 308 K/UL
PMV BLD AUTO: 10.8 FL
POCT GLUCOSE: 172 MG/DL (ref 70–110)
POCT GLUCOSE: 175 MG/DL (ref 70–110)
POCT GLUCOSE: 257 MG/DL (ref 70–110)
POCT GLUCOSE: 304 MG/DL (ref 70–110)
POTASSIUM SERPL-SCNC: 4.1 MMOL/L
PROTHROMBIN TIME: 11.1 SEC
RBC # BLD AUTO: 4.01 M/UL
RETIRED EF AND QEF - SEE NOTES: 55 (ref 55–65)
SODIUM SERPL-SCNC: 140 MMOL/L
WBC # BLD AUTO: 12.3 K/UL

## 2018-08-31 PROCEDURE — 27201913 EP LAB PROCEDURE

## 2018-08-31 PROCEDURE — 63600175 PHARM REV CODE 636 W HCPCS

## 2018-08-31 PROCEDURE — 94761 N-INVAS EAR/PLS OXIMETRY MLT: CPT

## 2018-08-31 PROCEDURE — 51798 US URINE CAPACITY MEASURE: CPT

## 2018-08-31 PROCEDURE — 25000003 PHARM REV CODE 250

## 2018-08-31 PROCEDURE — 82962 GLUCOSE BLOOD TEST: CPT | Mod: 91

## 2018-08-31 PROCEDURE — 93355 ECHO TRANSESOPHAGEAL (TEE): CPT | Mod: ,,, | Performed by: INTERNAL MEDICINE

## 2018-08-31 PROCEDURE — 85025 COMPLETE CBC W/AUTO DIFF WBC: CPT

## 2018-08-31 PROCEDURE — 25000003 PHARM REV CODE 250: Performed by: INTERNAL MEDICINE

## 2018-08-31 PROCEDURE — D9220A PRA ANESTHESIA: Mod: ANES,,, | Performed by: ANESTHESIOLOGY

## 2018-08-31 PROCEDURE — 99900035 HC TECH TIME PER 15 MIN (STAT)

## 2018-08-31 PROCEDURE — 82962 GLUCOSE BLOOD TEST: CPT | Mod: 91 | Performed by: INTERNAL MEDICINE

## 2018-08-31 PROCEDURE — 25000003 PHARM REV CODE 250: Performed by: NURSE ANESTHETIST, CERTIFIED REGISTERED

## 2018-08-31 PROCEDURE — 25000003 PHARM REV CODE 250: Performed by: NURSE PRACTITIONER

## 2018-08-31 PROCEDURE — 63600175 PHARM REV CODE 636 W HCPCS: Performed by: ANESTHESIOLOGY

## 2018-08-31 PROCEDURE — 37000009 HC ANESTHESIA EA ADD 15 MINS: Performed by: INTERNAL MEDICINE

## 2018-08-31 PROCEDURE — 93355 ECHO TRANSESOPHAGEAL (TEE): CPT

## 2018-08-31 PROCEDURE — 27000190 HC CPAP FULL FACE MASK W/VALVE

## 2018-08-31 PROCEDURE — 25500020 PHARM REV CODE 255

## 2018-08-31 PROCEDURE — 37000008 HC ANESTHESIA 1ST 15 MINUTES: Performed by: INTERNAL MEDICINE

## 2018-08-31 PROCEDURE — 63600175 PHARM REV CODE 636 W HCPCS: Performed by: NURSE ANESTHETIST, CERTIFIED REGISTERED

## 2018-08-31 PROCEDURE — 85610 PROTHROMBIN TIME: CPT

## 2018-08-31 PROCEDURE — 63600175 PHARM REV CODE 636 W HCPCS: Performed by: INTERNAL MEDICINE

## 2018-08-31 PROCEDURE — 93010 ELECTROCARDIOGRAM REPORT: CPT | Mod: ,,, | Performed by: INTERNAL MEDICINE

## 2018-08-31 PROCEDURE — 33340 PERQ CLSR TCAT L ATR APNDGE: CPT | Mod: 62,Q0,, | Performed by: INTERNAL MEDICINE

## 2018-08-31 PROCEDURE — 86850 RBC ANTIBODY SCREEN: CPT

## 2018-08-31 PROCEDURE — 27000221 HC OXYGEN, UP TO 24 HOURS

## 2018-08-31 PROCEDURE — 86920 COMPATIBILITY TEST SPIN: CPT

## 2018-08-31 PROCEDURE — 93005 ELECTROCARDIOGRAM TRACING: CPT

## 2018-08-31 PROCEDURE — 27100012 EP LAB PROCEDURE

## 2018-08-31 PROCEDURE — 80048 BASIC METABOLIC PNL TOTAL CA: CPT

## 2018-08-31 PROCEDURE — C1893 INTRO/SHEATH, FIXED,NON-PEEL: HCPCS

## 2018-08-31 PROCEDURE — 85730 THROMBOPLASTIN TIME PARTIAL: CPT

## 2018-08-31 PROCEDURE — 94660 CPAP INITIATION&MGMT: CPT

## 2018-08-31 PROCEDURE — D9220A PRA ANESTHESIA: Mod: CRNA,,, | Performed by: NURSE ANESTHETIST, CERTIFIED REGISTERED

## 2018-08-31 RX ORDER — SPIRONOLACTONE 25 MG/1
25 TABLET ORAL 2 TIMES DAILY
Status: DISCONTINUED | OUTPATIENT
Start: 2018-08-31 | End: 2018-09-01 | Stop reason: HOSPADM

## 2018-08-31 RX ORDER — SPIRONOLACTONE 25 MG/1
50 TABLET ORAL 2 TIMES DAILY
Status: DISCONTINUED | OUTPATIENT
Start: 2018-08-31 | End: 2018-08-31

## 2018-08-31 RX ORDER — LIDOCAINE HCL/PF 100 MG/5ML
SYRINGE (ML) INTRAVENOUS
Status: DISCONTINUED | OUTPATIENT
Start: 2018-08-31 | End: 2018-08-31

## 2018-08-31 RX ORDER — INSULIN ASPART 100 [IU]/ML
1-10 INJECTION, SOLUTION INTRAVENOUS; SUBCUTANEOUS EVERY 6 HOURS PRN
Status: DISCONTINUED | OUTPATIENT
Start: 2018-08-31 | End: 2018-09-01 | Stop reason: HOSPADM

## 2018-08-31 RX ORDER — HYDROCODONE BITARTRATE AND ACETAMINOPHEN 500; 5 MG/1; MG/1
TABLET ORAL
Status: DISCONTINUED | OUTPATIENT
Start: 2018-08-31 | End: 2018-09-01 | Stop reason: HOSPADM

## 2018-08-31 RX ORDER — SODIUM CHLORIDE 0.9 % (FLUSH) 0.9 %
3 SYRINGE (ML) INJECTION
Status: DISCONTINUED | OUTPATIENT
Start: 2018-08-31 | End: 2018-08-31

## 2018-08-31 RX ORDER — PHENYLEPHRINE HYDROCHLORIDE 10 MG/ML
INJECTION INTRAVENOUS
Status: DISCONTINUED | OUTPATIENT
Start: 2018-08-31 | End: 2018-08-31

## 2018-08-31 RX ORDER — PANTOPRAZOLE SODIUM 40 MG/1
40 TABLET, DELAYED RELEASE ORAL DAILY
Status: DISCONTINUED | OUTPATIENT
Start: 2018-08-31 | End: 2018-09-01 | Stop reason: HOSPADM

## 2018-08-31 RX ORDER — ROCURONIUM BROMIDE 10 MG/ML
INJECTION, SOLUTION INTRAVENOUS
Status: DISCONTINUED | OUTPATIENT
Start: 2018-08-31 | End: 2018-08-31

## 2018-08-31 RX ORDER — HEPARIN SODIUM 1000 [USP'U]/ML
INJECTION, SOLUTION INTRAVENOUS; SUBCUTANEOUS
Status: DISCONTINUED | OUTPATIENT
Start: 2018-08-31 | End: 2018-08-31

## 2018-08-31 RX ORDER — CEFAZOLIN SODIUM 1 G/3ML
2 INJECTION, POWDER, FOR SOLUTION INTRAMUSCULAR; INTRAVENOUS
Status: ACTIVE | OUTPATIENT
Start: 2018-08-31

## 2018-08-31 RX ORDER — SODIUM CHLORIDE 9 MG/ML
INJECTION, SOLUTION INTRAVENOUS CONTINUOUS
Status: DISCONTINUED | OUTPATIENT
Start: 2018-08-31 | End: 2018-08-31

## 2018-08-31 RX ORDER — FENTANYL CITRATE 50 UG/ML
INJECTION, SOLUTION INTRAMUSCULAR; INTRAVENOUS
Status: DISCONTINUED | OUTPATIENT
Start: 2018-08-31 | End: 2018-08-31

## 2018-08-31 RX ORDER — CEFAZOLIN SODIUM 1 G/3ML
INJECTION, POWDER, FOR SOLUTION INTRAMUSCULAR; INTRAVENOUS
Status: DISCONTINUED | OUTPATIENT
Start: 2018-08-31 | End: 2018-08-31

## 2018-08-31 RX ORDER — SODIUM CHLORIDE 0.9 % (FLUSH) 0.9 %
3 SYRINGE (ML) INJECTION
Status: DISCONTINUED | OUTPATIENT
Start: 2018-08-31 | End: 2018-09-01 | Stop reason: HOSPADM

## 2018-08-31 RX ORDER — MEPERIDINE HYDROCHLORIDE 50 MG/ML
12.5 INJECTION INTRAMUSCULAR; INTRAVENOUS; SUBCUTANEOUS ONCE AS NEEDED
Status: DISCONTINUED | OUTPATIENT
Start: 2018-08-31 | End: 2018-08-31

## 2018-08-31 RX ORDER — HYDROMORPHONE HYDROCHLORIDE 1 MG/ML
0.2 INJECTION, SOLUTION INTRAMUSCULAR; INTRAVENOUS; SUBCUTANEOUS EVERY 5 MIN PRN
Status: DISCONTINUED | OUTPATIENT
Start: 2018-08-31 | End: 2018-09-01 | Stop reason: HOSPADM

## 2018-08-31 RX ORDER — HYDROMORPHONE HYDROCHLORIDE 1 MG/ML
0.2 INJECTION, SOLUTION INTRAMUSCULAR; INTRAVENOUS; SUBCUTANEOUS EVERY 5 MIN PRN
Status: DISCONTINUED | OUTPATIENT
Start: 2018-08-31 | End: 2018-08-31

## 2018-08-31 RX ORDER — HYDRALAZINE HYDROCHLORIDE 50 MG/1
50 TABLET, FILM COATED ORAL 2 TIMES DAILY
Status: DISCONTINUED | OUTPATIENT
Start: 2018-08-31 | End: 2018-08-31

## 2018-08-31 RX ORDER — FUROSEMIDE 40 MG/1
40 TABLET ORAL 2 TIMES DAILY
Status: DISCONTINUED | OUTPATIENT
Start: 2018-08-31 | End: 2018-09-01 | Stop reason: HOSPADM

## 2018-08-31 RX ORDER — DIPHENHYDRAMINE HYDROCHLORIDE 50 MG/ML
25 INJECTION INTRAMUSCULAR; INTRAVENOUS EVERY 6 HOURS PRN
Status: DISCONTINUED | OUTPATIENT
Start: 2018-08-31 | End: 2018-09-01 | Stop reason: HOSPADM

## 2018-08-31 RX ORDER — LUBIPROSTONE 8 UG/1
8 CAPSULE ORAL 2 TIMES DAILY
Status: DISCONTINUED | OUTPATIENT
Start: 2018-08-31 | End: 2018-09-01 | Stop reason: HOSPADM

## 2018-08-31 RX ORDER — PROTAMINE SULFATE 10 MG/ML
INJECTION, SOLUTION INTRAVENOUS
Status: DISCONTINUED | OUTPATIENT
Start: 2018-08-31 | End: 2018-08-31

## 2018-08-31 RX ORDER — MEPERIDINE HYDROCHLORIDE 50 MG/ML
12.5 INJECTION INTRAMUSCULAR; INTRAVENOUS; SUBCUTANEOUS ONCE AS NEEDED
Status: ACTIVE | OUTPATIENT
Start: 2018-08-31 | End: 2018-08-31

## 2018-08-31 RX ORDER — SUCCINYLCHOLINE CHLORIDE 20 MG/ML
INJECTION INTRAMUSCULAR; INTRAVENOUS
Status: DISCONTINUED | OUTPATIENT
Start: 2018-08-31 | End: 2018-08-31

## 2018-08-31 RX ORDER — MIDAZOLAM HYDROCHLORIDE 1 MG/ML
INJECTION, SOLUTION INTRAMUSCULAR; INTRAVENOUS
Status: DISCONTINUED | OUTPATIENT
Start: 2018-08-31 | End: 2018-08-31

## 2018-08-31 RX ORDER — FENTANYL CITRATE 50 UG/ML
25 INJECTION, SOLUTION INTRAMUSCULAR; INTRAVENOUS EVERY 5 MIN PRN
Status: DISCONTINUED | OUTPATIENT
Start: 2018-08-31 | End: 2018-09-01 | Stop reason: HOSPADM

## 2018-08-31 RX ORDER — METOPROLOL TARTRATE 100 MG/1
100 TABLET ORAL 2 TIMES DAILY
Status: DISCONTINUED | OUTPATIENT
Start: 2018-08-31 | End: 2018-09-01 | Stop reason: HOSPADM

## 2018-08-31 RX ORDER — PROPOFOL 10 MG/ML
VIAL (ML) INTRAVENOUS
Status: DISCONTINUED | OUTPATIENT
Start: 2018-08-31 | End: 2018-08-31

## 2018-08-31 RX ORDER — GLUCAGON 1 MG
1 KIT INJECTION
Status: DISCONTINUED | OUTPATIENT
Start: 2018-08-31 | End: 2018-09-01 | Stop reason: HOSPADM

## 2018-08-31 RX ORDER — ACETAMINOPHEN 325 MG/1
650 TABLET ORAL EVERY 6 HOURS PRN
Status: DISCONTINUED | OUTPATIENT
Start: 2018-08-31 | End: 2018-09-01 | Stop reason: HOSPADM

## 2018-08-31 RX ADMIN — INSULIN ASPART 2 UNITS: 100 INJECTION, SOLUTION INTRAVENOUS; SUBCUTANEOUS at 08:08

## 2018-08-31 RX ADMIN — PROPOFOL 150 MG: 10 INJECTION, EMULSION INTRAVENOUS at 10:08

## 2018-08-31 RX ADMIN — HEPARIN SODIUM 8000 UNITS: 1000 INJECTION INTRAVENOUS; SUBCUTANEOUS at 12:08

## 2018-08-31 RX ADMIN — SUCCINYLCHOLINE CHLORIDE 120 MG: 20 INJECTION, SOLUTION INTRAMUSCULAR; INTRAVENOUS at 10:08

## 2018-08-31 RX ADMIN — SODIUM CHLORIDE, SODIUM GLUCONATE, SODIUM ACETATE, POTASSIUM CHLORIDE, MAGNESIUM CHLORIDE, SODIUM PHOSPHATE, DIBASIC, AND POTASSIUM PHOSPHATE: .53; .5; .37; .037; .03; .012; .00082 INJECTION, SOLUTION INTRAVENOUS at 10:08

## 2018-08-31 RX ADMIN — PHENYLEPHRINE HYDROCHLORIDE 100 MCG: 10 INJECTION INTRAVENOUS at 10:08

## 2018-08-31 RX ADMIN — PROTAMINE SULFATE 1 MG: 10 INJECTION, SOLUTION INTRAVENOUS at 12:08

## 2018-08-31 RX ADMIN — INSULIN DETEMIR 30 UNITS: 100 INJECTION, SOLUTION SUBCUTANEOUS at 06:08

## 2018-08-31 RX ADMIN — SPIRONOLACTONE 25 MG: 25 TABLET, FILM COATED ORAL at 06:08

## 2018-08-31 RX ADMIN — SODIUM CHLORIDE 1000 ML: 0.9 INJECTION, SOLUTION INTRAVENOUS at 06:08

## 2018-08-31 RX ADMIN — METOPROLOL TARTRATE 100 MG: 100 TABLET ORAL at 08:08

## 2018-08-31 RX ADMIN — FENTANYL CITRATE 100 MCG: 50 INJECTION, SOLUTION INTRAMUSCULAR; INTRAVENOUS at 10:08

## 2018-08-31 RX ADMIN — ROCURONIUM BROMIDE 10 MG: 10 INJECTION, SOLUTION INTRAVENOUS at 11:08

## 2018-08-31 RX ADMIN — PHENYLEPHRINE HYDROCHLORIDE 0.25 MCG/KG/MIN: 10 INJECTION INTRAVENOUS at 10:08

## 2018-08-31 RX ADMIN — MIDAZOLAM 2 MG: 1 INJECTION INTRAMUSCULAR; INTRAVENOUS at 10:08

## 2018-08-31 RX ADMIN — ROCURONIUM BROMIDE 5 MG: 10 INJECTION, SOLUTION INTRAVENOUS at 10:08

## 2018-08-31 RX ADMIN — APIXABAN 5 MG: 2.5 TABLET, FILM COATED ORAL at 06:08

## 2018-08-31 RX ADMIN — CEFAZOLIN 2 G: 330 INJECTION, POWDER, FOR SOLUTION INTRAMUSCULAR; INTRAVENOUS at 11:08

## 2018-08-31 RX ADMIN — LIDOCAINE HYDROCHLORIDE 100 MG: 20 INJECTION, SOLUTION INTRAVENOUS at 10:08

## 2018-08-31 RX ADMIN — PROPOFOL 30 MG: 10 INJECTION, EMULSION INTRAVENOUS at 11:08

## 2018-08-31 RX ADMIN — SUGAMMADEX 500 MG: 100 INJECTION, SOLUTION INTRAVENOUS at 12:08

## 2018-08-31 RX ADMIN — SODIUM CHLORIDE: 0.9 INJECTION, SOLUTION INTRAVENOUS at 10:08

## 2018-08-31 RX ADMIN — ROCURONIUM BROMIDE 15 MG: 10 INJECTION, SOLUTION INTRAVENOUS at 10:08

## 2018-08-31 RX ADMIN — FUROSEMIDE 40 MG: 40 TABLET ORAL at 08:08

## 2018-08-31 RX ADMIN — INSULIN ASPART 6 UNITS: 100 INJECTION, SOLUTION INTRAVENOUS; SUBCUTANEOUS at 08:08

## 2018-08-31 RX ADMIN — ROCURONIUM BROMIDE 10 MG: 10 INJECTION, SOLUTION INTRAVENOUS at 10:08

## 2018-08-31 RX ADMIN — PROTAMINE SULFATE 59 MG: 10 INJECTION, SOLUTION INTRAVENOUS at 12:08

## 2018-08-31 RX ADMIN — HEPARIN SODIUM 18000 UNITS: 1000 INJECTION INTRAVENOUS; SUBCUTANEOUS at 11:08

## 2018-08-31 NOTE — HPI
56 y/o female here for SADIE guided placement of Watchman Device.  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    Prior SADIE was cancelled x 2 due to AF RVR.   EF 55-60         Dysphagia or odynophagia:  No  Liver Disease, esophageal disease, or known varices:  No  Upper GI Bleeding: No  Snoring:  Yes  Sleep Apnea:  No  Prior neck surgery or radiation:  No  History of anesthetic difficulties:  No  Family history of anesthetic difficulties:  No  Last oral intake:  12 hours ago  Able to move neck in all directions:  Yes

## 2018-08-31 NOTE — SUBJECTIVE & OBJECTIVE
Past Medical History:   Diagnosis Date    Atrial fibrillation     COPD (chronic obstructive pulmonary disease)     CVA (cerebral vascular accident)     Diabetes mellitus     Hypertension        Past Surgical History:   Procedure Laterality Date    HERNIA REPAIR         Review of patient's allergies indicates:   Allergen Reactions    Sulfa (sulfonamide antibiotics) Shortness Of Breath       No current facility-administered medications on file prior to encounter.      Current Outpatient Medications on File Prior to Encounter   Medication Sig    apixaban (ELIQUIS) 5 mg Tab Take 5 mg by mouth 2 (two) times daily.    atorvastatin (LIPITOR) 80 MG tablet Take 1 tablet (80 mg total) by mouth once daily.    furosemide (LASIX) 40 MG tablet Take 40 mg by mouth 2 (two) times daily.    hydrALAZINE (APRESOLINE) 50 MG tablet Take 1 tablet (50 mg total) by mouth 2 (two) times daily.    insulin glargine (LANTUS) 100 unit/mL injection Inject 48 Units into the skin every evening.    ipratropium (ATROVENT HFA) 17 mcg/actuation inhaler Inhale 2 puffs into the lungs 4 (four) times daily as needed for Wheezing. Rescue    ISOSORBIDE MONONITRATE ORAL Take 30 mg by mouth.    lisinopril (PRINIVIL,ZESTRIL) 5 MG tablet Take 1 tablet (5 mg total) by mouth once daily.    lubiprostone (AMITIZA) 8 MCG Cap Take 8 mcg by mouth 2 (two) times daily.    metFORMIN (GLUCOPHAGE) 500 MG tablet Take 500 mg by mouth 2 (two) times daily.    metoprolol tartrate (LOPRESSOR) 50 MG tablet Take 2 tablets (100 mg total) by mouth 2 (two) times daily.    pantoprazole (PROTONIX) 40 MG tablet Take 40 mg by mouth once daily.    spironolactone (ALDACTONE) 50 MG tablet Take 50 mg by mouth 2 (two) times daily.    ibuprofen (ADVIL,MOTRIN) 600 MG tablet Take 600 mg by mouth every 6 (six) hours as needed for Pain.     Family History     None        Tobacco Use    Smoking status: Former Smoker     Last attempt to quit: 4/30/2018     Years since quitting:  0.3    Smokeless tobacco: Never Used   Substance and Sexual Activity    Alcohol use: Yes     Comment: occassionally    Drug use: Not on file    Sexual activity: Not on file     Review of Systems   All other systems reviewed and are negative.    Objective:     Vital Signs (Most Recent):  Temp: 98.2 °F (36.8 °C) (08/31/18 0630)  Pulse: 95 (08/31/18 0630)  Resp: 18 (08/31/18 0630)  BP: (!) 168/79 (08/31/18 0631)  SpO2: 98 % (08/31/18 0630) Vital Signs (24h Range):  Temp:  [98.2 °F (36.8 °C)] 98.2 °F (36.8 °C)  Pulse:  [95] 95  Resp:  [18] 18  SpO2:  [98 %] 98 %  BP: (168-178)/(79-88) 168/79     Weight: (!) 159.7 kg (352 lb)  Body mass index is 62.35 kg/m².    SpO2: 98 %  O2 Device (Oxygen Therapy): room air    No intake or output data in the 24 hours ending 08/31/18 0729    Lines/Drains/Airways     Peripheral Intravenous Line                 Peripheral IV - Single Lumen 08/31/18 0635 Right Hand less than 1 day                Physical Exam   Constitutional: She is oriented to person, place, and time. She appears well-developed and well-nourished.   HENT:   Head: Normocephalic and atraumatic.   Eyes: No scleral icterus.   Cardiovascular: Normal rate and normal heart sounds.   Irregular rhythm    Pulmonary/Chest: Effort normal and breath sounds normal. No respiratory distress. She has no wheezes. She has no rales.   Musculoskeletal: She exhibits no edema.   Neurological: She is alert and oriented to person, place, and time.   Skin: Skin is warm.       Significant Labs: All pertinent lab results from the last 24 hours have been reviewed.    Significant Imaging: None

## 2018-08-31 NOTE — ANESTHESIA PREPROCEDURE EVALUATION
08/31/2018  Sara Grey is a 57 y.o., female.  Patient Active Problem List   Diagnosis    Cerebrovascular accident (CVA) due to embolism    Diabetes mellitus type 2 in obese    Essential hypertension    Pure hypercholesterolemia    At high risk for  bleeding    Acute on chronic diastolic (congestive) heart failure    Snoring    Atrial fibrillation with rapid ventricular response    BMI 60.0-69.9, adult    COPD (chronic obstructive pulmonary disease)    AF (atrial fibrillation)    Hypoxemia    Atrial fibrillation    Atrial fibrillation, persistent         Pre-op Assessment    I have reviewed the Patient Summary Reports.      I have reviewed the Medications.     Review of Systems  Cardiovascular:   CONCLUSIONS     1 - Concentric hypertrophy.     2 - No wall motion abnormalities.     3 - Normal left ventricular systolic function (EF 55-60%).     4 - Normal left ventricular diastolic function.     5 - Low normal to mildly depressed right ventricular systolic function .     6 - The estimated PA systolic pressure is greater than 20 mmHg.        Physical Exam  General:  Well nourished, Obesity    Airway/Jaw/Neck:  Airway Findings: Mouth Opening: Normal Tongue: Normal  General Airway Assessment: Adult  Mallampati: III  Improves to III with phonation.  TM Distance: Normal, at least 6 cm      Dental:  Dental Findings: Edentulous   Chest/Lungs:  Chest/Lungs Findings: Clear to auscultation     Heart/Vascular:  Heart Findings: Rate: Tachycardia  Rhythm: Irregularly Irregular  Sounds: Normal        Mental Status:  Mental Status Findings:  Cooperative, Alert and Oriented         Anesthesia Plan  Type of Anesthesia, risks & benefits discussed:  Anesthesia Type:  general  Patient's Preference: General  Intra-op Monitoring Plan: standard ASA monitors  Intra-op Monitoring Plan Comments: Standard ASA  monitors.   Post Op Pain Control Plan: per primary service following discharge from PACU and multimodal analgesia  Post Op Pain Control Plan Comments: Per primary service.     Induction:   IV  Beta Blocker:  Patient is not currently on a Beta-Blocker (No further documentation required).       Informed Consent: Patient understands risks and agrees with Anesthesia plan.  Questions answered. Anesthesia consent signed with patient.  ASA Score: 3     Day of Surgery Review of History & Physical:    H&P update referred to the surgeon.     Anesthesia Plan Notes: .         Ready For Surgery From Anesthesia Perspective.

## 2018-08-31 NOTE — PROGRESS NOTES
Pt returned to unit AAOx4 and denies pain. R groin c/d/i without redness or swelling.   VSS.  Family called to bedside.  Post procedure protocol reviewed.  Will continue to monitor.

## 2018-08-31 NOTE — PROGRESS NOTES
Pt resting quitly,VSS, CPAP intact, right groin sutures removed w/o difficulty , HOB elevate 30 degrees, will continue to monitor pt.

## 2018-08-31 NOTE — H&P
[facility]  Cardiology  History and Physical     Patient Name: Sara Grey  MRN: [id number]  Admission Date: 8/31/2018  Code Status: Prior   Attending Provider: Ovi Ferraro MD   Primary Care Physician: Primary Doctor No  Principal Problem:<principal problem not specified>    Patient information was obtained from patient and ER records.     Subjective:     Chief Complaint:  Watchman     HPI:  56 y/o female here for SADIE guided placement of Watchman Device.  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    Prior SADIE was cancelled x 2 due to AF RVR.   EF 55-60         Dysphagia or odynophagia:  No  Liver Disease, esophageal disease, or known varices:  No  Upper GI Bleeding: No  Snoring:  Yes  Sleep Apnea:  No  Prior neck surgery or radiation:  No  History of anesthetic difficulties:  No  Family history of anesthetic difficulties:  No  Last oral intake:  12 hours ago  Able to move neck in all directions:  Yes      Past Medical History:   Diagnosis Date    Atrial fibrillation     COPD (chronic obstructive pulmonary disease)     CVA (cerebral vascular accident)     Diabetes mellitus     Hypertension        Past Surgical History:   Procedure Laterality Date    HERNIA REPAIR         Review of patient's allergies indicates:   Allergen Reactions    Sulfa (sulfonamide antibiotics) Shortness Of Breath       No current facility-administered medications on file prior to encounter.      Current Outpatient Medications on File Prior to Encounter   Medication Sig    apixaban (ELIQUIS) 5 mg Tab Take 5 mg by mouth 2 (two) times daily.    atorvastatin (LIPITOR) 80 MG tablet Take 1 tablet (80 mg total) by mouth once daily.    furosemide (LASIX) 40 MG tablet Take 40 mg by mouth 2 (two) times  daily.    hydrALAZINE (APRESOLINE) 50 MG tablet Take 1 tablet (50 mg total) by mouth 2 (two) times daily.    insulin glargine (LANTUS) 100 unit/mL injection Inject 48 Units into the skin every evening.    ipratropium (ATROVENT HFA) 17 mcg/actuation inhaler Inhale 2 puffs into the lungs 4 (four) times daily as needed for Wheezing. Rescue    ISOSORBIDE MONONITRATE ORAL Take 30 mg by mouth.    lisinopril (PRINIVIL,ZESTRIL) 5 MG tablet Take 1 tablet (5 mg total) by mouth once daily.    lubiprostone (AMITIZA) 8 MCG Cap Take 8 mcg by mouth 2 (two) times daily.    metFORMIN (GLUCOPHAGE) 500 MG tablet Take 500 mg by mouth 2 (two) times daily.    metoprolol tartrate (LOPRESSOR) 50 MG tablet Take 2 tablets (100 mg total) by mouth 2 (two) times daily.    pantoprazole (PROTONIX) 40 MG tablet Take 40 mg by mouth once daily.    spironolactone (ALDACTONE) 50 MG tablet Take 50 mg by mouth 2 (two) times daily.    ibuprofen (ADVIL,MOTRIN) 600 MG tablet Take 600 mg by mouth every 6 (six) hours as needed for Pain.     Family History     None        Tobacco Use    Smoking status: Former Smoker     Last attempt to quit: 2018     Years since quittin.3    Smokeless tobacco: Never Used   Substance and Sexual Activity    Alcohol use: Yes     Comment: occassionally    Drug use: Not on file    Sexual activity: Not on file     Review of Systems   All other systems reviewed and are negative.    Objective:     Vital Signs (Most Recent):  Temp: 98.2 °F (36.8 °C) (18)  Pulse: 95 (18)  Resp: 18 (18)  BP: (!) 168/79 (18)  SpO2: 98 % (18) Vital Signs (24h Range):  Temp:  [98.2 °F (36.8 °C)] 98.2 °F (36.8 °C)  Pulse:  [95] 95  Resp:  [18] 18  SpO2:  [98 %] 98 %  BP: (168-178)/(79-88) 168/79     Weight: (!) 159.7 kg (352 lb)  Body mass index is 62.35 kg/m².    SpO2: 98 %  O2 Device (Oxygen Therapy): room air    No intake or output data in the 24 hours ending 18  0729    Lines/Drains/Airways     Peripheral Intravenous Line                 Peripheral IV - Single Lumen 08/31/18 0635 Right Hand less than 1 day                Physical Exam   Constitutional: She is oriented to person, place, and time. She appears well-developed and well-nourished.   HENT:   Head: Normocephalic and atraumatic.   Eyes: No scleral icterus.   Cardiovascular: Normal rate and normal heart sounds.   Irregular rhythm    Pulmonary/Chest: Effort normal and breath sounds normal. No respiratory distress. She has no wheezes. She has no rales.   Musculoskeletal: She exhibits no edema.   Neurological: She is alert and oriented to person, place, and time.   Skin: Skin is warm.       Significant Labs: All pertinent lab results from the last 24 hours have been reviewed.    Significant Imaging: None    Assessment and Plan:     Atrial fibrillation, persistent      PLAN:  1. SADIE guided watchman device placement.    -The risks, benefits & alternatives of the procedure were explained to the patient.    -The risks of transesophageal echo include but are not limited to:  Dental trauma, esophageal trauma/perforation, bleeding, laryngospasm/brochospasm, aspiration, sore throat/hoarseness, & dislodgement of the endotracheal tube/nasogastric tube (where applicable).    -The risks of moderate sedation include hypotension, respiratory depression, arrhythmias, bronchospasm, & death.    -Informed consent was obtained & the patient is agreeable to proceed with the procedure.    I will discuss with the attending physician. Attending addendum is to follow.     Further recommendations per attending addendum                  VTE Risk Mitigation (From admission, onward)    None          Castro Zarate MD  Cardiology   [facility]

## 2018-08-31 NOTE — ASSESSMENT & PLAN NOTE
PLAN:  1. SADIE guided watchman device placement.    -The risks, benefits & alternatives of the procedure were explained to the patient.    -The risks of transesophageal echo include but are not limited to:  Dental trauma, esophageal trauma/perforation, bleeding, laryngospasm/brochospasm, aspiration, sore throat/hoarseness, & dislodgement of the endotracheal tube/nasogastric tube (where applicable).    -The risks of moderate sedation include hypotension, respiratory depression, arrhythmias, bronchospasm, & death.    -Informed consent was obtained & the patient is agreeable to proceed with the procedure.    I will discuss with the attending physician. Attending addendum is to follow.     Further recommendations per attending addendum

## 2018-08-31 NOTE — PROGRESS NOTES
Report called to Corinne RN, pt aa&Ox4,VSS, resp unlabored, denies c/o pain , rk po fluids, right groin dressing dry and intact.

## 2018-08-31 NOTE — TRANSFER OF CARE
"Anesthesia Transfer of Care Note    Patient: Sara Grey    Procedure(s) Performed: Procedure(s) (LRB):  CLOSURE, LEFT ATRIAL APPENDAGE, USING DEVICE (N/A)  ECHOCARDIOGRAM,TRANSESOPHAGEAL (N/A)    Patient location: PACU    Anesthesia Type: general    Transport from OR: Transported from OR on 6-10 L/min O2 by face mask with adequate spontaneous ventilation. Continuous ECG monitoring in transport. Continuous SpO2 monitoring in transport    Post pain: adequate analgesia    Post assessment: no apparent anesthetic complications and tolerated procedure well    Post vital signs: stable (Spo2 88-90%. Placed on BiPAP in PACU)    Level of consciousness: responds to stimulation    Nausea/Vomiting: no nausea/vomiting    Complications: none    Transfer of care protocol was followedComments: OBserved in OR for 10 mins after extubation.SPO2 88-90%. Placed on BiPAP in PACU.      Last vitals:   Visit Vitals  BP (!) 140/82 (BP Location: Left arm, Patient Position: Lying)   Pulse 91   Temp 36.4 °C (97.5 °F) (Temporal)   Resp (!) 22   Ht 5' 3" (1.6 m)   Wt (!) 159.7 kg (352 lb)   SpO2 (!) 90%   Breastfeeding? No   BMI 62.35 kg/m²     "

## 2018-08-31 NOTE — PROGRESS NOTES
Pt resting quetly,VSS per monitor,CPAP in use, pt remains flat, rk well left groin suture dry and intact, pedal pulse+2, attempted to up date family member/ Christian no answer on telephone. Pt remains stable.

## 2018-08-31 NOTE — H&P
Subjective:    Patient ID:  Sara Grey is a 57 y.o. female who presents for evaluation of No chief complaint on file.      57 yoF HTN, DM, AF, CVA, COPD, tob abuse here for LAAO watchman device placement. She has history of AF dating back several years.   She was placed on pradaxa for CVA prophylaxis. She suffered a CVA 6/17 on pradaxa leading to some speech impairment that is mostly improved. She was switched to eliquis but has had recurrent  bleeding. Her EF is normal at 55% but she has diastolic dynsfunction. She has moderate COPD, LUCRECIA and morbid obesity. No history of PVD.    We tried to get SADIE twice but on both occasion it had to be cancelled because of RVR or severe HTN.     EF 55% 6/25/17    Past Medical History:  As above     No past surgical history on file.    Social History    Marital status: Single              Spouse name:                       Years of education:                 Number of children:               Occupational History    None on file    Social History Main Topics    Smoking status: Current Every Day Smoker                                                       No family history on file.          Review of Systems   Constitution: Negative.   HENT: Negative.    Eyes: Negative.    Cardiovascular: Positive for dyspnea on exertion. Negative for chest pain, leg swelling, near-syncope, palpitations and syncope.   Respiratory: Positive for shortness of breath.    Endocrine: Negative.    Hematologic/Lymphatic: Negative.    Skin: Negative.    Musculoskeletal: Negative.    Gastrointestinal: Positive for bloating.   Genitourinary: Positive for non-menstrual bleeding.   Neurological: Negative.  Negative for dizziness and light-headedness.   Psychiatric/Behavioral: Negative.    Allergic/Immunologic: Negative.         Objective:    Physical Exam   Constitutional: She is oriented to person, place, and time. She appears well-developed and well-nourished. No distress.   Morbidly obese   HENT:    Head: Normocephalic and atraumatic.   Eyes: EOM are normal. Pupils are equal, round, and reactive to light.   Neck: Normal range of motion. No JVD present. No thyromegaly present.   Cardiovascular: Normal rate, S1 normal, S2 normal and normal heart sounds. An irregularly irregular rhythm present. PMI is not displaced. Exam reveals no gallop and no friction rub.   No murmur heard.  Pulmonary/Chest: Effort normal and breath sounds normal. No respiratory distress. She has no wheezes. She has no rales.   Abdominal: Soft. Bowel sounds are normal. She exhibits mass (ventral hernia). She exhibits no distension. There is no tenderness. There is no rebound and no guarding.   Musculoskeletal: Normal range of motion. She exhibits no edema or tenderness.   Neurological: She is alert and oriented to person, place, and time. No cranial nerve deficit.   Skin: Skin is warm and dry. No rash noted. No erythema.   Psychiatric: She has a normal mood and affect. Her behavior is normal. Judgment and thought content normal.   Vitals reviewed.        Assessment:       1. Persistent atrial fibrillation    2. Atrial fibrillation, persistent         Plan:       57 yoF persistent,  AF, HTN, DM, CVA morbid obesity and recurrent Gu bleed is here for a watchman device.   She can tolerate short term OAC but is not a candidate for long term OAC due to recurrent bleeding issues.   We discussed management options regarding LAAO. I discussed the process of LAAO via Watchman  Risks benefits and alternatives discussed in detail. She verbalized understanding and wants to proceed to watchman placement.     Watchman with Anesthesia- Choice, may need airway due to obesity/COPD  SADIE            Pre procedure      6 wks of elequis post watchman and then repeat SADIE - if it looks good then switch to DAPT for 6 mt.

## 2018-08-31 NOTE — ANESTHESIA POSTPROCEDURE EVALUATION
"Anesthesia Post Evaluation    Patient: Sara Grey    Procedure(s) Performed: Procedure(s) (LRB):  CLOSURE, LEFT ATRIAL APPENDAGE, USING DEVICE (N/A)  ECHOCARDIOGRAM,TRANSESOPHAGEAL (N/A)    Final Anesthesia Type: general  Patient location during evaluation: PACU  Patient participation: Yes- Able to Participate  Level of consciousness: awake and alert  Post-procedure vital signs: reviewed and stable  Pain management: adequate  Airway patency: patent  PONV status at discharge: No PONV  Anesthetic complications: no      Cardiovascular status: blood pressure returned to baseline and hemodynamically stable  Respiratory status: CPAP  Hydration status: euvolemic  Follow-up not needed.        Visit Vitals  BP (!) 172/74   Pulse 82   Temp 36.4 °C (97.5 °F) (Temporal)   Resp 19   Ht 5' 3" (1.6 m)   Wt (!) 159.7 kg (352 lb)   SpO2 96%   Breastfeeding? No   BMI 62.35 kg/m²       Pain/Joanne Score: Pain Assessment Performed: Yes (8/31/2018  2:00 PM)  Presence of Pain: non-verbal indicators absent (8/31/2018  2:00 PM)        "

## 2018-09-01 VITALS
BODY MASS INDEX: 51.91 KG/M2 | TEMPERATURE: 98 F | WEIGHT: 293 LBS | DIASTOLIC BLOOD PRESSURE: 97 MMHG | OXYGEN SATURATION: 97 % | HEART RATE: 85 BPM | RESPIRATION RATE: 20 BRPM | SYSTOLIC BLOOD PRESSURE: 175 MMHG | HEIGHT: 63 IN

## 2018-09-01 LAB — POCT GLUCOSE: 186 MG/DL (ref 70–110)

## 2018-09-01 PROCEDURE — 82962 GLUCOSE BLOOD TEST: CPT | Mod: 91

## 2018-09-01 PROCEDURE — 82962 GLUCOSE BLOOD TEST: CPT | Performed by: INTERNAL MEDICINE

## 2018-09-01 NOTE — NURSING
Pt is AAOx3 and in no apparent distress. R groin site c/d/i without redness or swelling. Provided a copy of discharge instructions.  Teaching performed.  Pt verbalized understanding and denied any questions.  Pt states that she will esume her morning meds when she gets home. PIV d/c catheter tip intact.  2x2 applied and no active bleeding noted.  Pt waiting for wheelchair to escort to garage.   at the bedside.

## 2018-09-01 NOTE — DISCHARGE SUMMARY
[facility]  Cardiac Electrophysiology  Discharge Summary      Patient Name: Sara Grey  MRN: [id number]  Admission Date: 8/31/2018  Hospital Length of Stay: 1 days  Discharge Date and Time:  09/01/2018 8:13 AM  Attending Physician: Ovi Ferraro MD    Discharging Provider: Michelle Knox MD  Primary Care Physician: Primary Doctor No    HPI:   Sara Grey is a 57 y.o. female who presents for evaluation of No chief complaint on file.        57 yoF HTN, DM, AF, CVA, COPD, tob abuse here for LAAO watchman device placement. She has history of AF dating back several years.   She was placed on pradaxa for CVA prophylaxis. She suffered a CVA 6/17 on pradaxa leading to some speech impairment that is mostly improved. She was switched to eliquis but has had recurrent  bleeding. Her EF is normal at 55% but she has diastolic dynsfunction. She has moderate COPD, LUCRECIA and morbid obesity. No history of PVD.     We tried to get SADIE twice but on both occasion it had to be cancelled because of RVR or severe HTN.      EF 55% 6/25/17         Procedure(s) (LRB):  CLOSURE, LEFT ATRIAL APPENDAGE, USING DEVICE (N/A)  ECHOCARDIOGRAM,TRANSESOPHAGEAL (N/A)     Indwelling Lines/Drains at time of discharge:  Lines/Drains/Airways          None          Hospital Course:  Patient was placed in outpatient and underwent LAAO via right groin.She underwent successful placement of watchman device via right CFV access under SADIE guidance.She tolerated the procedure well and had no complications.Observed overnight and discharge am in stable condition.Groin with no bleeding, hematoma,distal pulse intact.        Consults:     Significant Diagnostic Studies: Labs:   BMP:   Recent Labs   Lab  08/31/18   0608   GLU  311*   NA  140   K  4.1   CL  103   CO2  26   BUN  20   CREATININE  1.2   CALCIUM  9.1   , CMP   Recent Labs   Lab  08/31/18   0608   NA  140   K  4.1   CL  103   CO2  26   GLU  311*   BUN  20   CREATININE  1.2    CALCIUM  9.1   ANIONGAP  11   ESTGFRAFRICA  58.8*   EGFRNONAA  51.0*   , CBC   Recent Labs   Lab  08/31/18   0608   WBC  12.30   HGB  10.9*   HCT  35.3*   PLT  308    and INR   Lab Results   Component Value Date    INR 1.1 08/31/2018    INR 1.1 06/08/2018       Pending Diagnostic Studies:     None          Final Active Diagnoses:    Diagnosis Date Noted POA    Atrial fibrillation, persistent [I48.1] 08/31/2018 Yes      Problems Resolved During this Admission:     No new Assessment & Plan notes have been filed under this hospital service since the last note was generated.  Service: Arrhythmia      Discharged Condition: good    Disposition: Home or Self Care    Follow Up:  Follow-up Information     Ovi Ferraro MD In 6 weeks.    Specialties:  Electrophysiology, Cardiovascular Disease  Contact information:  [address] [place] [id number]  [phone]                 Patient Instructions:      Diet Cardiac     Lifting restrictions   Scheduling Instructions: 7 days     Notify your health care provider if you experience any of the following:  temperature >100.4     Notify your health care provider if you experience any of the following:  persistent nausea and vomiting or diarrhea     Notify your health care provider if you experience any of the following:  severe uncontrolled pain     Notify your health care provider if you experience any of the following:  persistent dizziness, light-headedness, or visual disturbances     Notify your health care provider if you experience any of the following:  increased confusion or weakness     Notify your health care provider if you experience any of the following:  difficulty breathing or increased cough     No dressing needed     Image acquisition, interpretation and report only complete   Standing Status: Future Standing Exp. Date: 08/31/19     Medications:  Reconciled Home Medications:      Medication List      CONTINUE taking these medications    atorvastatin  80 MG tablet  Commonly known as:  LIPITOR  Take 1 tablet (80 mg total) by mouth once daily.     ELIQUIS 5 mg Tab  Generic drug:  apixaban  Take 5 mg by mouth 2 (two) times daily.     furosemide 40 MG tablet  Commonly known as:  LASIX  Take 40 mg by mouth 2 (two) times daily.     hydrALAZINE 50 MG tablet  Commonly known as:  APRESOLINE  Take 1 tablet (50 mg total) by mouth 2 (two) times daily.     ibuprofen 600 MG tablet  Commonly known as:  ADVIL,MOTRIN  Take 600 mg by mouth every 6 (six) hours as needed for Pain.     insulin glargine 100 unit/mL injection  Commonly known as:  LANTUS  Inject 48 Units into the skin every evening.     ipratropium 17 mcg/actuation inhaler  Commonly known as:  ATROVENT HFA  Inhale 2 puffs into the lungs 4 (four) times daily as needed for Wheezing. Rescue     ISOSORBIDE MONONITRATE ORAL  Take 30 mg by mouth.     lisinopril 5 MG tablet  Commonly known as:  PRINIVIL,ZESTRIL  Take 1 tablet (5 mg total) by mouth once daily.     lubiprostone 8 MCG Cap  Commonly known as:  AMITIZA  Take 8 mcg by mouth 2 (two) times daily.     metFORMIN 500 MG tablet  Commonly known as:  GLUCOPHAGE  Take 500 mg by mouth 2 (two) times daily.     metoprolol tartrate 50 MG tablet  Commonly known as:  LOPRESSOR  Take 2 tablets (100 mg total) by mouth 2 (two) times daily.     pantoprazole 40 MG tablet  Commonly known as:  PROTONIX  Take 40 mg by mouth once daily.     spironolactone 50 MG tablet  Commonly known as:  ALDACTONE  Take 50 mg by mouth 2 (two) times daily.            Time spent on the discharge of patient: 45 minutes    Michelle Knox MD  Cardiac Electrophysiology  [facility]

## 2018-09-01 NOTE — PLAN OF CARE
Problem: Patient Care Overview  Goal: Plan of Care Review  Outcome: Ongoing (interventions implemented as appropriate)  R groin site remains CDI, no bleeding or hematoma noted.  Pt voided post procedure.  Will cont to monitor.

## 2018-09-01 NOTE — HPI
Sara Grey is a 57 y.o. female who presents for evaluation of No chief complaint on file.        57 yoF HTN, DM, AF, CVA, COPD, tob abuse here for LAAO watchman device placement. She has history of AF dating back several years.   She was placed on pradaxa for CVA prophylaxis. She suffered a CVA 6/17 on pradaxa leading to some speech impairment that is mostly improved. She was switched to eliquis but has had recurrent  bleeding. Her EF is normal at 55% but she has diastolic dynsfunction. She has moderate COPD, LUCRECIA and morbid obesity. No history of PVD.     We tried to get SADIE twice but on both occasion it had to be cancelled because of RVR or severe HTN.      EF 55% 6/25/17

## 2018-09-01 NOTE — HOSPITAL COURSE
Patient was placed in outpatient and underwent LAAO via right groin.She underwent successful placement of watchman device via right CFV access under SADIE guidance.She tolerated the procedure well and had no complications.Observed overnight and discharge am in stable condition.Groin with no bleeding, hematoma,distal pulse intact.

## 2018-09-04 LAB
BLD PROD TYP BPU: NORMAL
BLD PROD TYP BPU: NORMAL
BLOOD UNIT EXPIRATION DATE: NORMAL
BLOOD UNIT EXPIRATION DATE: NORMAL
BLOOD UNIT TYPE CODE: 7300
BLOOD UNIT TYPE CODE: 7300
BLOOD UNIT TYPE: NORMAL
BLOOD UNIT TYPE: NORMAL
CODING SYSTEM: NORMAL
CODING SYSTEM: NORMAL
DISPENSE STATUS: NORMAL
DISPENSE STATUS: NORMAL
NUM UNITS TRANS PACKED RBC: NORMAL
NUM UNITS TRANS PACKED RBC: NORMAL
POCT GLUCOSE: 121 MG/DL (ref 70–110)

## 2018-09-06 LAB
POC ACTIVATED CLOTTING TIME K: 274 SEC (ref 74–137)
POC ACTIVATED CLOTTING TIME K: 318 SEC (ref 74–137)
SAMPLE: ABNORMAL
SAMPLE: ABNORMAL

## 2018-10-15 ENCOUNTER — HOSPITAL ENCOUNTER (OUTPATIENT)
Facility: HOSPITAL | Age: 58
Discharge: HOME | End: 2018-10-15
Attending: INTERNAL MEDICINE | Admitting: INTERNAL MEDICINE
Payer: MEDICARE

## 2018-10-15 ENCOUNTER — HOSPITAL ENCOUNTER (OUTPATIENT)
Dept: CARDIOLOGY | Facility: CLINIC | Age: 58
Discharge: HOME | End: 2018-10-15
Attending: INTERNAL MEDICINE | Admitting: INTERNAL MEDICINE
Payer: MEDICARE

## 2018-10-15 ENCOUNTER — ANESTHESIA EVENT (OUTPATIENT)
Dept: MEDSURG UNIT | Facility: HOSPITAL | Age: 58
End: 2018-10-15
Payer: MEDICARE

## 2018-10-15 ENCOUNTER — ANESTHESIA (OUTPATIENT)
Dept: MEDSURG UNIT | Facility: HOSPITAL | Age: 58
End: 2018-10-15
Payer: MEDICARE

## 2018-10-15 VITALS
HEIGHT: 63 IN | TEMPERATURE: 99 F | SYSTOLIC BLOOD PRESSURE: 179 MMHG | RESPIRATION RATE: 22 BRPM | WEIGHT: 293 LBS | BODY MASS INDEX: 51.91 KG/M2 | DIASTOLIC BLOOD PRESSURE: 95 MMHG | OXYGEN SATURATION: 93 % | HEART RATE: 108 BPM

## 2018-10-15 DIAGNOSIS — I48.91 ATRIAL FIBRILLATION: ICD-10-CM

## 2018-10-15 DIAGNOSIS — I48.19 ATRIAL FIBRILLATION, PERSISTENT: Primary | ICD-10-CM

## 2018-10-15 LAB
DIASTOLIC DYSFUNCTION: NO
MITRAL VALVE MOBILITY: NORMAL
POCT GLUCOSE: 164 MG/DL (ref 70–110)
RETIRED EF AND QEF - SEE NOTES: 55 (ref 55–65)

## 2018-10-15 PROCEDURE — 25000003 PHARM REV CODE 250: Performed by: NURSE PRACTITIONER

## 2018-10-15 PROCEDURE — D9220A PRA ANESTHESIA: Mod: ANES,,, | Performed by: ANESTHESIOLOGY

## 2018-10-15 PROCEDURE — 93312 ECHO TRANSESOPHAGEAL: CPT | Mod: 26,,, | Performed by: INTERNAL MEDICINE

## 2018-10-15 PROCEDURE — 37000008 HC ANESTHESIA 1ST 15 MINUTES

## 2018-10-15 PROCEDURE — 82962 GLUCOSE BLOOD TEST: CPT

## 2018-10-15 PROCEDURE — 25000003 PHARM REV CODE 250: Performed by: NURSE ANESTHETIST, CERTIFIED REGISTERED

## 2018-10-15 PROCEDURE — D9220A PRA ANESTHESIA: Mod: CRNA,,, | Performed by: NURSE ANESTHETIST, CERTIFIED REGISTERED

## 2018-10-15 PROCEDURE — 93325 DOPPLER ECHO COLOR FLOW MAPG: CPT | Mod: 26,,, | Performed by: INTERNAL MEDICINE

## 2018-10-15 PROCEDURE — 37000009 HC ANESTHESIA EA ADD 15 MINS

## 2018-10-15 PROCEDURE — 93312 ECHO TRANSESOPHAGEAL: CPT

## 2018-10-15 PROCEDURE — 63600175 PHARM REV CODE 636 W HCPCS: Performed by: NURSE ANESTHETIST, CERTIFIED REGISTERED

## 2018-10-15 PROCEDURE — 93320 DOPPLER ECHO COMPLETE: CPT | Mod: 26,,, | Performed by: INTERNAL MEDICINE

## 2018-10-15 RX ORDER — PROPOFOL 10 MG/ML
VIAL (ML) INTRAVENOUS CONTINUOUS PRN
Status: DISCONTINUED | OUTPATIENT
Start: 2018-10-15 | End: 2018-10-15

## 2018-10-15 RX ORDER — NAPROXEN SODIUM 220 MG/1
81 TABLET, FILM COATED ORAL DAILY
Refills: 0 | COMMUNITY
Start: 2018-10-15 | End: 2019-05-23 | Stop reason: ALTCHOICE

## 2018-10-15 RX ORDER — MIDAZOLAM HYDROCHLORIDE 1 MG/ML
INJECTION, SOLUTION INTRAMUSCULAR; INTRAVENOUS
Status: DISCONTINUED | OUTPATIENT
Start: 2018-10-15 | End: 2018-10-15

## 2018-10-15 RX ORDER — HYDROMORPHONE HYDROCHLORIDE 1 MG/ML
0.2 INJECTION, SOLUTION INTRAMUSCULAR; INTRAVENOUS; SUBCUTANEOUS EVERY 5 MIN PRN
Status: CANCELLED | OUTPATIENT
Start: 2018-10-15

## 2018-10-15 RX ORDER — LIDOCAINE HCL/PF 100 MG/5ML
SYRINGE (ML) INTRAVENOUS
Status: DISCONTINUED | OUTPATIENT
Start: 2018-10-15 | End: 2018-10-15

## 2018-10-15 RX ORDER — KETAMINE HYDROCHLORIDE 10 MG/ML
INJECTION, SOLUTION INTRAMUSCULAR; INTRAVENOUS
Status: DISCONTINUED | OUTPATIENT
Start: 2018-10-15 | End: 2018-10-15

## 2018-10-15 RX ORDER — MEPERIDINE HYDROCHLORIDE 50 MG/ML
12.5 INJECTION INTRAMUSCULAR; INTRAVENOUS; SUBCUTANEOUS ONCE AS NEEDED
Status: CANCELLED | OUTPATIENT
Start: 2018-10-15 | End: 2018-10-15

## 2018-10-15 RX ORDER — ONDANSETRON 8 MG/1
8 TABLET, ORALLY DISINTEGRATING ORAL EVERY 8 HOURS PRN
Status: DISCONTINUED | OUTPATIENT
Start: 2018-10-15 | End: 2018-10-15 | Stop reason: HOSPADM

## 2018-10-15 RX ORDER — SODIUM CHLORIDE 9 MG/ML
INJECTION, SOLUTION INTRAVENOUS CONTINUOUS
Status: DISCONTINUED | OUTPATIENT
Start: 2018-10-15 | End: 2018-10-15 | Stop reason: HOSPADM

## 2018-10-15 RX ORDER — CLOPIDOGREL BISULFATE 75 MG/1
75 TABLET ORAL DAILY
Qty: 30 TABLET | Refills: 11 | Status: SHIPPED | OUTPATIENT
Start: 2018-10-15 | End: 2019-05-23 | Stop reason: ALTCHOICE

## 2018-10-15 RX ORDER — LORAZEPAM 2 MG/ML
0.25 INJECTION INTRAMUSCULAR ONCE AS NEEDED
Status: CANCELLED | OUTPATIENT
Start: 2018-10-15 | End: 2018-10-15

## 2018-10-15 RX ORDER — SODIUM CHLORIDE 9 MG/ML
INJECTION, SOLUTION INTRAVENOUS CONTINUOUS
Status: CANCELLED | OUTPATIENT
Start: 2018-10-15

## 2018-10-15 RX ORDER — PROPOFOL 10 MG/ML
VIAL (ML) INTRAVENOUS
Status: DISCONTINUED | OUTPATIENT
Start: 2018-10-15 | End: 2018-10-15

## 2018-10-15 RX ADMIN — PROPOFOL 50 MCG/KG/MIN: 10 INJECTION, EMULSION INTRAVENOUS at 01:10

## 2018-10-15 RX ADMIN — PROPOFOL 30 MG: 10 INJECTION, EMULSION INTRAVENOUS at 01:10

## 2018-10-15 RX ADMIN — SODIUM CHLORIDE 1000 ML: 0.9 INJECTION, SOLUTION INTRAVENOUS at 11:10

## 2018-10-15 RX ADMIN — SODIUM CHLORIDE: 0.9 INJECTION, SOLUTION INTRAVENOUS at 01:10

## 2018-10-15 RX ADMIN — KETAMINE HYDROCHLORIDE 10 MG: 10 INJECTION, SOLUTION INTRAMUSCULAR; INTRAVENOUS at 01:10

## 2018-10-15 RX ADMIN — LIDOCAINE HYDROCHLORIDE 100 MG: 20 INJECTION, SOLUTION INTRAVENOUS at 01:10

## 2018-10-15 RX ADMIN — MIDAZOLAM 2 MG: 1 INJECTION INTRAMUSCULAR; INTRAVENOUS at 01:10

## 2018-10-15 NOTE — PLAN OF CARE
Patient discharged per MD orders. Instructions given on medications, wound care, activity, signs of infection, when to call MD, and follow up appointments. Pt verbalized understanding. PIV removed. Patient and family used scooter off unit.

## 2018-10-15 NOTE — PROGRESS NOTES
Patient admitted to recovery see [facility] for complete assessment pacu bcg' s maintained safety measures verified patient instructed on pain scale and patient verbalized understanding.

## 2018-10-15 NOTE — H&P
[facility]  Cardiology  History and Physical     Patient Name: Sara Grey  MRN: [id number]  Admission Date: 10/15/2018  Code Status: Prior   Attending Provider: Ovi Ferraro MD   Primary Care Physician: Primary Doctor No  Principal Problem:<principal problem not specified>    Patient information was obtained from patient and past medical records.     Subjective:     Chief Complaint:  S/P Watchman     HPI:  57 yoF HTN, DM, AF, CVA, COPD, tob abuse, s/p LAAO watchman device placement 9/1/18. She has history of AF dating back several years.   She was placed on pradaxa for CVA prophylaxis. She suffered a CVA 6/17 on pradaxa leading to some speech impairment that is mostly improved. She was switched to eliquis but has had recurrent  bleeding. Her EF is normal at 55% but she has diastolic dynsfunction. She has moderate COPD, LUCRECIA and morbid obesity. No history of PVD.    She presents for surveillance SADIE to eval placement and positioning of watchman.    Dysphagia or odynophagia:  No  Liver Disease, esophageal disease, or known varices:  No  Upper GI Bleeding: No  Snoring:  Yes  Sleep Apnea:  No  Prior neck surgery or radiation:  No  History of anesthetic difficulties:  No  Family history of anesthetic difficulties:  No  Last oral intake:  12 hours ago  Able to move neck in all directions:  Yes    Mallampati:2  ASA:2    Imaging:  SADIE:  CONCLUSIONS     1 - Biatrial enlargement.     2 - Left atrial appendage is single lobed with no visualized thrombus.     3 - Normal left ventricular systolic function (EF 55-60%).     4 - Grade 3 atheroma disease of aorta.     5 - Normal left ventricular diastolic function.     6 - SADIE performed in conjuction with watchman, watchman device placed without any significant humberto-device leak, and well seated.  No post-procedural effusion, small residual ASD s/p trans-septal. Velocity in pulmonary veins remained within normal   limits..       Past Medical History:    Diagnosis Date    Atrial fibrillation     COPD (chronic obstructive pulmonary disease)     CVA (cerebral vascular accident)     Diabetes mellitus     Hypertension        Past Surgical History:   Procedure Laterality Date    CLOSURE OF LEFT ATRIAL APPENDAGE USING DEVICE N/A 8/31/2018    Procedure: CLOSURE, LEFT ATRIAL APPENDAGE, USING DEVICE;  Surgeon: Ovi Ferraro MD;  Location: [facility] CATH LAB;  Service: Cardiology;  Laterality: N/A;  PAF, Watchman, Adam Sci, Gen, MB/SK, 3 Prep    CLOSURE, LEFT ATRIAL APPENDAGE, USING DEVICE N/A 8/31/2018    Performed by Ovi Ferraro MD at [facility] CATH LAB    ECHOCARDIOGRAM,TRANSESOPHAGEAL N/A 8/31/2018    Performed by Ovi Ferraro MD at [facility] CATH LAB    ECHOCARDIOGRAM,TRANSESOPHAGEAL N/A 8/16/2018    Performed by [facility] Provider at [facility] CATH [facility]    HERNIA REPAIR      TRANSESOPHAGEAL ECHOCARDIOGRAM (SADIE) N/A 6/8/2018    Performed by [facility] Provider at [facility] CATH LAB    TRANSESOPHAGEAL ECHOCARDIOGRAPHY N/A 6/8/2018    Procedure: TRANSESOPHAGEAL ECHOCARDIOGRAM (SADIE);  Surgeon: [facility] Diagnostic Provider;  Location: [facility] CATH LAB;  Service: Cardiology;  Laterality: N/A;  AF, SADIE for Watchman, Choice, MB, 3 Prep       Review of patient's allergies indicates:   Allergen Reactions    Sulfa (sulfonamide antibiotics) Shortness Of Breath       Current Facility-Administered Medications on File Prior to Encounter   Medication    ceFAZolin injection 2 g     Current Outpatient Medications on File Prior to Encounter   Medication Sig    apixaban (ELIQUIS) 5 mg Tab Take 5 mg by mouth 2 (two) times daily.    atorvastatin (LIPITOR) 80 MG tablet Take 1 tablet (80 mg total) by mouth once daily.    furosemide (LASIX) 40 MG tablet Take 40 mg by mouth 2 (two) times daily.    hydrALAZINE (APRESOLINE) 50 MG tablet Take 1 tablet (50 mg total) by mouth 2 (two) times daily.    insulin glargine (LANTUS) 100 unit/mL injection Inject 48 Units into the skin every  evening.    ISOSORBIDE MONONITRATE ORAL Take 30 mg by mouth.    lisinopril (PRINIVIL,ZESTRIL) 5 MG tablet Take 1 tablet (5 mg total) by mouth once daily.    lubiprostone (AMITIZA) 8 MCG Cap Take 8 mcg by mouth 2 (two) times daily.    metoprolol tartrate (LOPRESSOR) 50 MG tablet Take 2 tablets (100 mg total) by mouth 2 (two) times daily.    spironolactone (ALDACTONE) 50 MG tablet Take 50 mg by mouth 2 (two) times daily.    ibuprofen (ADVIL,MOTRIN) 600 MG tablet Take 600 mg by mouth every 6 (six) hours as needed for Pain.    ipratropium (ATROVENT HFA) 17 mcg/actuation inhaler Inhale 2 puffs into the lungs 4 (four) times daily as needed for Wheezing. Rescue    metFORMIN (GLUCOPHAGE) 500 MG tablet Take 500 mg by mouth 2 (two) times daily.    pantoprazole (PROTONIX) 40 MG tablet Take 40 mg by mouth once daily.     Family History     None        Tobacco Use    Smoking status: Former Smoker     Last attempt to quit: 2018     Years since quittin.4    Smokeless tobacco: Never Used   Substance and Sexual Activity    Alcohol use: Yes     Comment: occassionally    Drug use: No    Sexual activity: Not on file     Review of Systems   Constitution: Negative.   HENT: Negative.    Eyes: Negative.    Cardiovascular: Negative.    Respiratory: Negative.    Endocrine: Negative.    Skin: Negative.    Musculoskeletal: Negative.    Gastrointestinal: Negative.    Genitourinary: Negative.    Neurological: Negative.      Objective:     Vital Signs (Most Recent):  Temp: 97.8 °F (36.6 °C) (10/15/18 1057)  Pulse: 100 (10/15/18 1057)  Resp: 20 (10/15/18 1057)  BP: (!) 144/70 (10/15/18 1100)  SpO2: (!) 94 % (10/15/18 1057) Vital Signs (24h Range):  Temp:  [97.8 °F (36.6 °C)] 97.8 °F (36.6 °C)  Pulse:  [100] 100  Resp:  [20] 20  SpO2:  [94 %] 94 %  BP: (144-149)/(70-74) 144/70     Weight: (!) 163.3 kg (360 lb)  Body mass index is 63.77 kg/m².    SpO2: (!) 94 %  O2 Device (Oxygen Therapy): nasal cannula    No intake or output  data in the 24 hours ending 10/15/18 1145    Lines/Drains/Airways     Peripheral Intravenous Line                 Peripheral IV - Single Lumen 08/31/18 0635 Right Hand 45 days         Peripheral IV - Single Lumen 08/31/18 1040 Right Antecubital 45 days         Peripheral IV - Single Lumen 10/15/18 1130 Left Antecubital less than 1 day                Physical Exam   Constitutional: She is oriented to person, place, and time. She appears well-developed and well-nourished.   HENT:   Head: Normocephalic and atraumatic.   Eyes: Conjunctivae and EOM are normal. Pupils are equal, round, and reactive to light.   Neck: Normal range of motion. Neck supple. No JVD present.   Cardiovascular: Normal rate and normal heart sounds. Exam reveals no gallop and no friction rub.   No murmur heard.  IRR   Pulmonary/Chest: Effort normal and breath sounds normal. No respiratory distress. She has no wheezes. She has no rales. She exhibits no tenderness.   Abdominal: Soft. Bowel sounds are normal. She exhibits no distension. There is no tenderness.   Musculoskeletal: Normal range of motion. She exhibits no edema or tenderness.   Neurological: She is alert and oriented to person, place, and time.   Skin: Skin is warm and dry. No erythema. No pallor.       Significant Labs:   Recent Lab Results     None          Significant Imaging: as per hpi    Assessment and Plan:     Atrial fibrillation      PLAN:  1. SADIE for evaluation of LA/XOCHILT    -The risks, benefits & alternatives of the procedure were explained to the patient.    -The risks of transesophageal echo include but are not limited to:  Dental trauma, esophageal trauma/perforation, bleeding, laryngospasm/brochospasm, aspiration, sore throat/hoarseness, & dislodgement of the endotracheal tube/nasogastric tube (where applicable).    -The risks of moderate sedation include hypotension, respiratory depression, arrhythmias, bronchospasm, & death.    -Informed consent was obtained & the patient  is agreeable to proceed with the procedure.    I will discuss with the attending physician. Attending addendum is to follow.     Further recommendations per attending addendum    Mata Ventura MD  PGY-V Cardiology Fellow  [phone]

## 2018-10-15 NOTE — ANESTHESIA PREPROCEDURE EVALUATION
10/15/2018  Sara Grey is a 57 y.o., female.    Anesthesia Evaluation    I have reviewed the Patient Summary Reports.    I have reviewed the Nursing Notes.      Review of Systems  Anesthesia Hx:  No problems with previous Anesthesia    Hematology/Oncology:  Hematology Normal   Oncology Normal     EENT/Dental:EENT/Dental Normal   Cardiovascular:   Hypertension CHF    Pulmonary:   COPD    Renal/:  Renal/ Normal     Hepatic/GI:  Hepatic/GI Normal    Musculoskeletal:  Musculoskeletal Normal    Neurological:   CVA    Endocrine:   Diabetes    Dermatological:  Skin Normal    Psych:  Psychiatric Normal           Physical Exam  General:  Morbid Obesity    Airway/Jaw/Neck:  Airway Findings: Mouth Opening: Normal Tongue: Normal  General Airway Assessment: Adult  Mallampati: III  TM Distance: Normal, at least 6 cm        Eyes/Ears/Nose:  EYES/EARS/NOSE FINDINGS: Normal   Dental:  Dental Findings: In tact   Chest/Lungs:  Chest/Lungs Clear    Heart/Vascular:  Heart Findings: Normal Heart murmur: negative Vascular Findings: Normal    Abdomen:  Abdomen Findings: Normal    Musculoskeletal:  Musculoskeletal Findings: Normal   Skin:  Skin Findings: Normal    Mental Status:  Mental Status Findings: Normal        Anesthesia Plan  Type of Anesthesia, risks & benefits discussed:  Anesthesia Type:  general  Patient's Preference:   Intra-op Monitoring Plan:   Intra-op Monitoring Plan Comments:   Post Op Pain Control Plan:   Post Op Pain Control Plan Comments:   Induction:   IV  Beta Blocker:  Patient is on a Beta-Blocker and has received one dose within the past 24 hours (No further documentation required).       Informed Consent: Patient understands risks and agrees with Anesthesia plan.  Questions answered. Anesthesia consent signed with patient.  ASA Score: 3     Day of Surgery Review of History & Physical:    H&P update  referred to the surgeon.         Ready For Surgery From Anesthesia Perspective.

## 2018-10-15 NOTE — NURSING TRANSFER
Nursing Transfer Note      10/15/2018     Transfer To: short stay 9    Transfer via stretcher    Transfer with nurse    Transported by dino wyman    Medicines sent: none    Chart send with patient: Yes    Notified: nurse    Patient reassessed at: see epic (date, time)    Upon arrival to floor: reported to lennie wyman patient to room no complaints no distress noted.

## 2018-10-15 NOTE — TRANSFER OF CARE
"Anesthesia Transfer of Care Note    Patient: Sara Grey    Procedure(s) Performed: Procedure(s) (LRB):  ECHOCARDIOGRAM,TRANSESOPHAGEAL (N/A)    Patient location: PACU    Anesthesia Type: general    Transport from OR: Transported from OR on 2-3 L/min O2 by NC with adequate spontaneous ventilation    Post pain: adequate analgesia    Post assessment: no apparent anesthetic complications and tolerated procedure well    Post vital signs: stable    Level of consciousness: awake, alert and oriented    Nausea/Vomiting: no nausea/vomiting    Complications: none    Transfer of care protocol was followed      Last vitals:   Visit Vitals  BP (!) 144/70 (BP Location: Left arm, Patient Position: Lying)   Pulse 100   Temp 36.6 °C (97.8 °F) (Oral)   Resp 20   Ht 5' 3" (1.6 m)   Wt (!) 163.3 kg (360 lb)   SpO2 (!) 94%   Breastfeeding? No   BMI 63.77 kg/m²     "

## 2018-10-15 NOTE — PLAN OF CARE
Problem: Patient Care Overview  Goal: Plan of Care Review  Outcome: Ongoing (interventions implemented as appropriate)  Pt arrived to unit via ambulatory from home accompanied by her significant other. Pt oriented to room. Iv inserted. Admit assessment completed. Nurse call bell within reach. Will continue to monitor.

## 2018-10-15 NOTE — HOSPITAL COURSE
SADIE completed without incident.  2mm jet just inferior to LUPV.  Please refer to full report for details.  Patient to be dc'ed on asa/plavix to complete 6 months after discussion with EP staff.

## 2018-10-15 NOTE — PLAN OF CARE
Received report from AVILA Chandler. Patient s/p SADIE, AAOx3. VSS, no c/o pain or discomfort at this time, resp even and unlabored. Post procedure protocol reviewed with patient and patient's family. Understanding verbalized. Family members at bedside. Nurse call bell within reach. Will continue to monitor per post procedure protocol.

## 2018-10-15 NOTE — SUBJECTIVE & OBJECTIVE
Past Medical History:   Diagnosis Date    Atrial fibrillation     COPD (chronic obstructive pulmonary disease)     CVA (cerebral vascular accident)     Diabetes mellitus     Hypertension        Past Surgical History:   Procedure Laterality Date    CLOSURE OF LEFT ATRIAL APPENDAGE USING DEVICE N/A 8/31/2018    Procedure: CLOSURE, LEFT ATRIAL APPENDAGE, USING DEVICE;  Surgeon: Ovi Ferraro MD;  Location: [facility] CATH LAB;  Service: Cardiology;  Laterality: N/A;  PAF, Watchman, Adam Sci, Gen, MB/SK, 3 Prep    CLOSURE, LEFT ATRIAL APPENDAGE, USING DEVICE N/A 8/31/2018    Performed by Ovi Ferraro MD at [facility] CATH LAB    ECHOCARDIOGRAM,TRANSESOPHAGEAL N/A 8/31/2018    Performed by Ovi Ferraro MD at [facility] CATH LAB    ECHOCARDIOGRAM,TRANSESOPHAGEAL N/A 8/16/2018    Performed by [facility] Diagnostic Provider at [facility] CATH LAB    HERNIA REPAIR      TRANSESOPHAGEAL ECHOCARDIOGRAM (SADIE) N/A 6/8/2018    Performed by [facility] Diagnostic Provider at [facility] CATH LAB    TRANSESOPHAGEAL ECHOCARDIOGRAPHY N/A 6/8/2018    Procedure: TRANSESOPHAGEAL ECHOCARDIOGRAM (SADIE);  Surgeon: [facility] Diagnostic Provider;  Location: [facility] CATH LAB;  Service: Cardiology;  Laterality: N/A;  AF, SADIE for Watchman, Choice, MB, 3 Prep       Review of patient's allergies indicates:   Allergen Reactions    Sulfa (sulfonamide antibiotics) Shortness Of Breath       Current Facility-Administered Medications on File Prior to Encounter   Medication    ceFAZolin injection 2 g     Current Outpatient Medications on File Prior to Encounter   Medication Sig    apixaban (ELIQUIS) 5 mg Tab Take 5 mg by mouth 2 (two) times daily.    atorvastatin (LIPITOR) 80 MG tablet Take 1 tablet (80 mg total) by mouth once daily.    furosemide (LASIX) 40 MG tablet Take 40 mg by mouth 2 (two) times daily.    hydrALAZINE (APRESOLINE) 50 MG tablet Take 1 tablet (50 mg total) by mouth 2 (two) times daily.    insulin glargine (LANTUS) 100 unit/mL injection Inject 48  Units into the skin every evening.    ISOSORBIDE MONONITRATE ORAL Take 30 mg by mouth.    lisinopril (PRINIVIL,ZESTRIL) 5 MG tablet Take 1 tablet (5 mg total) by mouth once daily.    lubiprostone (AMITIZA) 8 MCG Cap Take 8 mcg by mouth 2 (two) times daily.    metoprolol tartrate (LOPRESSOR) 50 MG tablet Take 2 tablets (100 mg total) by mouth 2 (two) times daily.    spironolactone (ALDACTONE) 50 MG tablet Take 50 mg by mouth 2 (two) times daily.    ibuprofen (ADVIL,MOTRIN) 600 MG tablet Take 600 mg by mouth every 6 (six) hours as needed for Pain.    ipratropium (ATROVENT HFA) 17 mcg/actuation inhaler Inhale 2 puffs into the lungs 4 (four) times daily as needed for Wheezing. Rescue    metFORMIN (GLUCOPHAGE) 500 MG tablet Take 500 mg by mouth 2 (two) times daily.    pantoprazole (PROTONIX) 40 MG tablet Take 40 mg by mouth once daily.     Family History     None        Tobacco Use    Smoking status: Former Smoker     Last attempt to quit: 2018     Years since quittin.4    Smokeless tobacco: Never Used   Substance and Sexual Activity    Alcohol use: Yes     Comment: occassionally    Drug use: No    Sexual activity: Not on file     Review of Systems   Constitution: Negative.   HENT: Negative.    Eyes: Negative.    Cardiovascular: Negative.    Respiratory: Negative.    Endocrine: Negative.    Skin: Negative.    Musculoskeletal: Negative.    Gastrointestinal: Negative.    Genitourinary: Negative.    Neurological: Negative.      Objective:     Vital Signs (Most Recent):  Temp: 97.8 °F (36.6 °C) (10/15/18 1057)  Pulse: 100 (10/15/18 1057)  Resp: 20 (10/15/18 1057)  BP: (!) 144/70 (10/15/18 1100)  SpO2: (!) 94 % (10/15/18 1057) Vital Signs (24h Range):  Temp:  [97.8 °F (36.6 °C)] 97.8 °F (36.6 °C)  Pulse:  [100] 100  Resp:  [20] 20  SpO2:  [94 %] 94 %  BP: (144-149)/(70-74) 144/70     Weight: (!) 163.3 kg (360 lb)  Body mass index is 63.77 kg/m².    SpO2: (!) 94 %  O2 Device (Oxygen Therapy): nasal  cannula    No intake or output data in the 24 hours ending 10/15/18 1145    Lines/Drains/Airways     Peripheral Intravenous Line                 Peripheral IV - Single Lumen 08/31/18 0635 Right Hand 45 days         Peripheral IV - Single Lumen 08/31/18 1040 Right Antecubital 45 days         Peripheral IV - Single Lumen 10/15/18 1130 Left Antecubital less than 1 day                Physical Exam   Constitutional: She is oriented to person, place, and time. She appears well-developed and well-nourished.   HENT:   Head: Normocephalic and atraumatic.   Eyes: Conjunctivae and EOM are normal. Pupils are equal, round, and reactive to light.   Neck: Normal range of motion. Neck supple. No JVD present.   Cardiovascular: Normal rate and normal heart sounds. Exam reveals no gallop and no friction rub.   No murmur heard.  IRR   Pulmonary/Chest: Effort normal and breath sounds normal. No respiratory distress. She has no wheezes. She has no rales. She exhibits no tenderness.   Abdominal: Soft. Bowel sounds are normal. She exhibits no distension. There is no tenderness.   Musculoskeletal: Normal range of motion. She exhibits no edema or tenderness.   Neurological: She is alert and oriented to person, place, and time.   Skin: Skin is warm and dry. No erythema. No pallor.       Significant Labs:   Recent Lab Results     None          Significant Imaging: as per hpi

## 2018-10-15 NOTE — HPI
57 yoF HTN, DM, AF, CVA, COPD, tob abuse, s/p LAAO watchman device placement 9/1/18. She has history of AF dating back several years.   She was placed on pradaxa for CVA prophylaxis. She suffered a CVA 6/17 on pradaxa leading to some speech impairment that is mostly improved. She was switched to eliquis but has had recurrent  bleeding. Her EF is normal at 55% but she has diastolic dynsfunction. She has moderate COPD, LUCRECIA and morbid obesity. No history of PVD.    She presents for surveillance SADIE to eval placement and positioning of watchman.    Dysphagia or odynophagia:  No  Liver Disease, esophageal disease, or known varices:  No  Upper GI Bleeding: No  Snoring:  Yes  Sleep Apnea:  No  Prior neck surgery or radiation:  No  History of anesthetic difficulties:  No  Family history of anesthetic difficulties:  No  Last oral intake:  12 hours ago  Able to move neck in all directions:  Yes    Mallampati:2  ASA:2    Imaging:  SADIE:  CONCLUSIONS     1 - Biatrial enlargement.     2 - Left atrial appendage is single lobed with no visualized thrombus.     3 - Normal left ventricular systolic function (EF 55-60%).     4 - Grade 3 atheroma disease of aorta.     5 - Normal left ventricular diastolic function.     6 - SADIE performed in conjuction with watchman, watchman device placed without any significant humberto-device leak, and well seated.  No post-procedural effusion, small residual ASD s/p trans-septal. Velocity in pulmonary veins remained within normal   limits..

## 2018-10-15 NOTE — DISCHARGE SUMMARY
[facility]  Cardiology  Discharge Summary      Patient Name: Sara Grey  MRN: [id number]  Admission Date: 10/15/2018  Hospital Length of Stay: 0 days  Discharge Date and Time:  10/15/2018 2:28 PM  Attending Physician: Ovi Ferraro MD    Discharging Provider: Mata Ventura MD  Primary Care Physician: Primary Doctor No    HPI:   57 yoF HTN, DM, AF, CVA, COPD, tob abuse, s/p LAAO watchman device placement 9/1/18. She has history of AF dating back several years.   She was placed on pradaxa for CVA prophylaxis. She suffered a CVA 6/17 on pradaxa leading to some speech impairment that is mostly improved. She was switched to eliquis but has had recurrent  bleeding. Her EF is normal at 55% but she has diastolic dynsfunction. She has moderate COPD, LUCRECIA and morbid obesity. No history of PVD.    She presents for surveillance SADIE to eval placement and positioning of watchman.    Dysphagia or odynophagia:  No  Liver Disease, esophageal disease, or known varices:  No  Upper GI Bleeding: No  Snoring:  Yes  Sleep Apnea:  No  Prior neck surgery or radiation:  No  History of anesthetic difficulties:  No  Family history of anesthetic difficulties:  No  Last oral intake:  12 hours ago  Able to move neck in all directions:  Yes    Mallampati:2  ASA:2    Imaging:  SADIE:  CONCLUSIONS     1 - Biatrial enlargement.     2 - Left atrial appendage is single lobed with no visualized thrombus.     3 - Normal left ventricular systolic function (EF 55-60%).     4 - Grade 3 atheroma disease of aorta.     5 - Normal left ventricular diastolic function.     6 - SADIE performed in conjuction with watchman, watchman device placed without any significant humberto-device leak, and well seated.  No post-procedural effusion, small residual ASD s/p trans-septal. Velocity in pulmonary veins remained within normal   limits..       Procedure(s) (LRB):  ECHOCARDIOGRAM,TRANSESOPHAGEAL (N/A)     Indwelling Lines/Drains at time of  discharge:  Lines/Drains/Airways     Airway                 Nasopharyngeal Airway less than 1 day                Hospital Course:  SADIE completed without incident.  2mm jet just inferior to LUPV.  Please refer to full report for details.  Patient to be dc'ed on asa/plavix to complete 6 months after discussion with EP staff.    Consults:     Significant Diagnostic Studies: Labs: All labs within the past 24 hours have been reviewed    Pending Diagnostic Studies:     None          Final Active Diagnoses:    Diagnosis Date Noted POA    Atrial fibrillation [I48.91] 08/16/2018 Yes      Problems Resolved During this Admission:     No new Assessment & Plan notes have been filed under this hospital service since the last note was generated.  Service: Cardiology      Discharged Condition: stable    Disposition: Home or Self Care    Follow Up: With Dr Ferraro as arranged prior    Patient Instructions:   No discharge procedures on file.  Medications:  Reconciled Home Medications:      Medication List      START taking these medications    aspirin 81 MG Chew  Take 1 tablet (81 mg total) by mouth once daily.     clopidogrel 75 mg tablet  Commonly known as:  PLAVIX  Take 1 tablet (75 mg total) by mouth once daily.        CONTINUE taking these medications    atorvastatin 80 MG tablet  Commonly known as:  LIPITOR  Take 1 tablet (80 mg total) by mouth once daily.     furosemide 40 MG tablet  Commonly known as:  LASIX  Take 40 mg by mouth 2 (two) times daily.     hydrALAZINE 50 MG tablet  Commonly known as:  APRESOLINE  Take 1 tablet (50 mg total) by mouth 2 (two) times daily.     ibuprofen 600 MG tablet  Commonly known as:  ADVIL,MOTRIN  Take 600 mg by mouth every 6 (six) hours as needed for Pain.     insulin glargine 100 unit/mL injection  Commonly known as:  LANTUS  Inject 48 Units into the skin every evening.     ipratropium 17 mcg/actuation inhaler  Commonly known as:  ATROVENT HFA  Inhale 2 puffs into the lungs 4 (four) times  daily as needed for Wheezing. Rescue     ISOSORBIDE MONONITRATE ORAL  Take 30 mg by mouth.     lisinopril 5 MG tablet  Commonly known as:  PRINIVIL,ZESTRIL  Take 1 tablet (5 mg total) by mouth once daily.     lubiprostone 8 MCG Cap  Commonly known as:  AMITIZA  Take 8 mcg by mouth 2 (two) times daily.     metFORMIN 500 MG tablet  Commonly known as:  GLUCOPHAGE  Take 500 mg by mouth 2 (two) times daily.     metoprolol tartrate 50 MG tablet  Commonly known as:  LOPRESSOR  Take 2 tablets (100 mg total) by mouth 2 (two) times daily.     pantoprazole 40 MG tablet  Commonly known as:  PROTONIX  Take 40 mg by mouth once daily.     spironolactone 50 MG tablet  Commonly known as:  ALDACTONE  Take 50 mg by mouth 2 (two) times daily.        STOP taking these medications    ELIQUIS 5 mg Tab  Generic drug:  apixaban            Time spent on the discharge of patient: 40 minutes    Mata Ventura MD  Cardiology  [facility]

## 2018-10-15 NOTE — ASSESSMENT & PLAN NOTE
PLAN:  1. SADIE for evaluation of LA/XOCHILT    -The risks, benefits & alternatives of the procedure were explained to the patient.    -The risks of transesophageal echo include but are not limited to:  Dental trauma, esophageal trauma/perforation, bleeding, laryngospasm/brochospasm, aspiration, sore throat/hoarseness, & dislodgement of the endotracheal tube/nasogastric tube (where applicable).    -The risks of moderate sedation include hypotension, respiratory depression, arrhythmias, bronchospasm, & death.    -Informed consent was obtained & the patient is agreeable to proceed with the procedure.    I will discuss with the attending physician. Attending addendum is to follow.     Further recommendations per attending addendum    Mata Ventura MD  PGY-V Cardiology Fellow  [phone]

## 2018-10-16 LAB — POCT GLUCOSE: 267 MG/DL (ref 70–110)

## 2018-10-16 NOTE — ANESTHESIA POSTPROCEDURE EVALUATION
"Anesthesia Post Evaluation    Patient: Sara Grey    Procedure(s) Performed: Procedure(s) (LRB):  ECHOCARDIOGRAM,TRANSESOPHAGEAL (N/A)    Final Anesthesia Type: general  Patient location during evaluation: PACU  Patient participation: Yes- Able to Participate  Level of consciousness: awake and alert  Post-procedure vital signs: reviewed and stable  Pain management: adequate  Airway patency: patent  PONV status at discharge: No PONV  Anesthetic complications: no      Cardiovascular status: blood pressure returned to baseline  Respiratory status: spontaneous ventilation and room air  Hydration status: euvolemic  Follow-up not needed.        Visit Vitals  BP (!) 179/95   Pulse 108   Temp 37.1 °C (98.8 °F) (Oral)   Resp (!) 22   Ht 5' 3" (1.6 m)   Wt (!) 163.3 kg (360 lb)   SpO2 (!) 93%   Breastfeeding? No   BMI 63.77 kg/m²       Pain/Joanne Score: Pain Assessment Performed: Yes (10/15/2018  2:32 PM)  Presence of Pain: denies (10/15/2018  2:32 PM)  Joanne Score: 9 (10/15/2018  2:13 PM)        "

## 2018-10-26 DIAGNOSIS — I48.91 ATRIAL FIBRILLATION, UNSPECIFIED TYPE: Primary | ICD-10-CM

## 2018-10-29 ENCOUNTER — OFFICE VISIT (OUTPATIENT)
Dept: CARDIOLOGY | Facility: CLINIC | Age: 58
End: 2018-10-29
Payer: MEDICARE

## 2018-10-29 ENCOUNTER — CLINICAL SUPPORT (OUTPATIENT)
Dept: CARDIOLOGY | Facility: CLINIC | Age: 58
End: 2018-10-29
Payer: MEDICARE

## 2018-10-29 VITALS
SYSTOLIC BLOOD PRESSURE: 158 MMHG | HEART RATE: 131 BPM | BODY MASS INDEX: 51.91 KG/M2 | DIASTOLIC BLOOD PRESSURE: 90 MMHG | WEIGHT: 293 LBS | HEIGHT: 63 IN

## 2018-10-29 DIAGNOSIS — I63.40 CEREBROVASCULAR ACCIDENT (CVA) DUE TO EMBOLISM OF CEREBRAL ARTERY: ICD-10-CM

## 2018-10-29 DIAGNOSIS — E11.69 DIABETES MELLITUS TYPE 2 IN OBESE: ICD-10-CM

## 2018-10-29 DIAGNOSIS — E66.9 DIABETES MELLITUS TYPE 2 IN OBESE: ICD-10-CM

## 2018-10-29 DIAGNOSIS — I48.19 PERSISTENT ATRIAL FIBRILLATION: Primary | ICD-10-CM

## 2018-10-29 DIAGNOSIS — I50.33 ACUTE ON CHRONIC DIASTOLIC (CONGESTIVE) HEART FAILURE: ICD-10-CM

## 2018-10-29 DIAGNOSIS — I48.91 ATRIAL FIBRILLATION, UNSPECIFIED TYPE: ICD-10-CM

## 2018-10-29 DIAGNOSIS — I10 ESSENTIAL HYPERTENSION: ICD-10-CM

## 2018-10-29 PROCEDURE — 93010 ELECTROCARDIOGRAM REPORT: CPT | Mod: S$PBB,,, | Performed by: NUCLEAR MEDICINE

## 2018-10-29 PROCEDURE — 3045F PR MOST RECENT HEMOGLOBIN A1C LEVEL 7.0-9.0%: CPT | Mod: CPTII,,, | Performed by: INTERNAL MEDICINE

## 2018-10-29 PROCEDURE — 3008F BODY MASS INDEX DOCD: CPT | Mod: CPTII,,, | Performed by: INTERNAL MEDICINE

## 2018-10-29 PROCEDURE — 99999 PR PBB SHADOW E&M-EST. PATIENT-LVL III: CPT | Mod: PBBFAC,,, | Performed by: INTERNAL MEDICINE

## 2018-10-29 PROCEDURE — 93005 ELECTROCARDIOGRAM TRACING: CPT | Mod: PBBFAC | Performed by: NUCLEAR MEDICINE

## 2018-10-29 PROCEDURE — 3077F SYST BP >= 140 MM HG: CPT | Mod: CPTII,,, | Performed by: INTERNAL MEDICINE

## 2018-10-29 PROCEDURE — 99214 OFFICE O/P EST MOD 30 MIN: CPT | Mod: S$PBB,,, | Performed by: INTERNAL MEDICINE

## 2018-10-29 PROCEDURE — 99213 OFFICE O/P EST LOW 20 MIN: CPT | Mod: PBBFAC,25 | Performed by: INTERNAL MEDICINE

## 2018-10-29 PROCEDURE — 3080F DIAST BP >= 90 MM HG: CPT | Mod: CPTII,,, | Performed by: INTERNAL MEDICINE

## 2018-10-29 RX ORDER — DOXAZOSIN 4 MG/1
4 TABLET ORAL NIGHTLY
COMMUNITY

## 2018-10-29 RX ORDER — LOSARTAN POTASSIUM 100 MG/1
100 TABLET ORAL DAILY
COMMUNITY

## 2018-10-29 RX ORDER — OXYBUTYNIN CHLORIDE 5 MG/1
5 TABLET ORAL 3 TIMES DAILY
COMMUNITY

## 2018-10-29 NOTE — PROGRESS NOTES
Subjective:    Patient ID:  Sara Grey is a 58 y.o. female who presents for follow-up of Atrial Fibrillation      58 yoF HTN, DM, AF, CVA, COPD, tob abuse here for LAAO consideration. She has history of AF dating back several years. She was placed on pradaxa for CVA prophylaxis. She suffered a CVA 6/17 on pradaxa leading to some speech impairment that is mostly improved. She was switched to eliquis but has had recurrent  bleeding. Her EF is normal at 55% but she has diastolic dynsfunction. She has moderate COPD, LUCRECIA and morbid obesity. No history of PVD.    Interval history: Watchman device 8/31/18 successfully deployed. No procedural related complications. Repeat SADIE 10/15/18 showed 2 mm jet, apixaban stopped. Now on DAPT.     EF 55% 6/25/17    Past Medical History:  No date: Atrial fibrillation  No date: CHF (congestive heart failure)  No date: COPD (chronic obstructive pulmonary disease)  No date: CVA (cerebral vascular accident)  No date: Diabetes mellitus  No date: Hyperlipidemia  No date: Hypertension    Past Surgical History:  8/31/2018: CLOSURE OF LEFT ATRIAL APPENDAGE USING DEVICE; N/A      Comment:  Procedure: CLOSURE, LEFT ATRIAL APPENDAGE, USING DEVICE;               Surgeon: Ovi Ferraro MD;  Location: [facility] CATH                LAB;  Service: Cardiology;  Laterality: N/A;  PAF,                Watchman, Adam Sci, Gen, MB/SK, 3 Prep  8/31/2018: CLOSURE, LEFT ATRIAL APPENDAGE, USING DEVICE; N/A      Comment:  Performed by Ovi Ferraro MD at [facility] CATH LAB  10/15/2018: ECHOCARDIOGRAM,TRANSESOPHAGEAL; N/A      Comment:  Performed by [facility] Diagnostic Provider at [facility] CATH LAB  8/31/2018: ECHOCARDIOGRAM,TRANSESOPHAGEAL; N/A      Comment:  Performed by Ovi Ferraro MD at [facility] CATH LAB  8/16/2018: ECHOCARDIOGRAM,TRANSESOPHAGEAL; N/A      Comment:  Performed by [facility] Diagnostic Provider at [facility] CATH LAB  No date: HERNIA REPAIR  6/8/2018: TRANSESOPHAGEAL ECHOCARDIOGRAM (SADIE); N/A       Comment:  Performed by [facility] Diagnostic Provider at [facility] CATH LAB  2018: TRANSESOPHAGEAL ECHOCARDIOGRAPHY; N/A      Comment:  Procedure: TRANSESOPHAGEAL ECHOCARDIOGRAM (SADIE);                 Surgeon: [facility] Diagnostic Provider;  Location: [facility] CATH                LAB;  Service: Cardiology;  Laterality: N/A;  AF, SADIE for               Watchman, Choice, MB, 3 Prep    Social History    Socioeconomic History      Marital status: Single      Spouse name: Not on file      Number of children: Not on file      Years of education: Not on file      Highest education level: Not on file    Social Needs      Financial resource strain: Not on file      Food insecurity - worry: Not on file      Food insecurity - inability: Not on file      Transportation needs - medical: Not on file      Transportation needs - non-medical: Not on file    Occupational History      Not on file    Tobacco Use      Smoking status: Former Smoker        Quit date: 2018        Years since quittin.4      Smokeless tobacco: Never Used    Substance and Sexual Activity      Alcohol use: Yes        Comment: occassionally      Drug use: No      Sexual activity: Not on file    Other Topics      Concerns:        Not on file    Social History Narrative      Not on file    History reviewed.  No pertinent family history.          Review of Systems   Constitution: Negative.   HENT: Negative.    Eyes: Negative.    Cardiovascular: Positive for dyspnea on exertion. Negative for chest pain, leg swelling, near-syncope, palpitations and syncope.   Respiratory: Positive for shortness of breath.    Endocrine: Negative.    Hematologic/Lymphatic: Negative.    Skin: Negative.    Musculoskeletal: Negative.    Gastrointestinal: Positive for bloating.   Genitourinary: Positive for non-menstrual bleeding.   Neurological: Negative.  Negative for dizziness and light-headedness.   Psychiatric/Behavioral: Negative.    Allergic/Immunologic: Negative.         Objective:     Physical Exam   Constitutional: She is oriented to person, place, and time. She appears well-developed and well-nourished. No distress.   Morbidly obese   HENT:   Head: Normocephalic and atraumatic.   Eyes: EOM are normal. Pupils are equal, round, and reactive to light.   Neck: Normal range of motion. No JVD present. No thyromegaly present.   Cardiovascular: Normal rate, S1 normal, S2 normal and normal heart sounds. An irregularly irregular rhythm present. PMI is not displaced. Exam reveals no gallop and no friction rub.   No murmur heard.  Pulmonary/Chest: Effort normal and breath sounds normal. No respiratory distress. She has no wheezes. She has no rales.   Abdominal: Soft. Bowel sounds are normal. She exhibits mass (ventral hernia). She exhibits no distension. There is no tenderness. There is no rebound and no guarding.   Musculoskeletal: Normal range of motion. She exhibits no edema or tenderness.   Neurological: She is alert and oriented to person, place, and time. No cranial nerve deficit.   Skin: Skin is warm and dry. No rash noted. No erythema.   Psychiatric: She has a normal mood and affect. Her behavior is normal. Judgment and thought content normal.   Vitals reviewed.        Assessment:       1. Persistent atrial fibrillation    2. Acute on chronic diastolic (congestive) heart failure    3. Cerebrovascular accident (CVA) due to embolism of cerebral artery    4. Diabetes mellitus type 2 in obese    5. Essential hypertension         Plan:       58 yoF persistent AF,  bleeding, CVA here for post Watchman visit. 45d SADIE showed 2 mm jet. OAC stopped and DAPT started to complete 3/1/19. RTC 6m

## 2018-11-13 RX ORDER — METOPROLOL TARTRATE 50 MG/1
TABLET ORAL
Qty: 360 TABLET | Refills: 0 | OUTPATIENT
Start: 2018-11-13

## 2019-05-23 ENCOUNTER — OFFICE VISIT (OUTPATIENT)
Dept: CARDIOLOGY | Facility: CLINIC | Age: 59
End: 2019-05-23
Payer: MEDICARE

## 2019-05-23 VITALS
DIASTOLIC BLOOD PRESSURE: 82 MMHG | BODY MASS INDEX: 51.91 KG/M2 | HEART RATE: 73 BPM | HEIGHT: 63 IN | WEIGHT: 293 LBS | SYSTOLIC BLOOD PRESSURE: 122 MMHG | OXYGEN SATURATION: 82 %

## 2019-05-23 DIAGNOSIS — I48.19 PERSISTENT ATRIAL FIBRILLATION: Primary | ICD-10-CM

## 2019-05-23 DIAGNOSIS — Z95.818 PRESENCE OF WATCHMAN LEFT ATRIAL APPENDAGE CLOSURE DEVICE: ICD-10-CM

## 2019-05-23 DIAGNOSIS — Z91.89 AT HIGH RISK FOR GU BLEEDING: ICD-10-CM

## 2019-05-23 DIAGNOSIS — I63.40 CEREBROVASCULAR ACCIDENT (CVA) DUE TO EMBOLISM OF CEREBRAL ARTERY: ICD-10-CM

## 2019-05-23 PROCEDURE — 3074F PR MOST RECENT SYSTOLIC BLOOD PRESSURE < 130 MM HG: ICD-10-PCS | Mod: CPTII,S$GLB,, | Performed by: INTERNAL MEDICINE

## 2019-05-23 PROCEDURE — 99999 PR PBB SHADOW E&M-EST. PATIENT-LVL III: CPT | Mod: PBBFAC,,, | Performed by: INTERNAL MEDICINE

## 2019-05-23 PROCEDURE — 3008F BODY MASS INDEX DOCD: CPT | Mod: CPTII,S$GLB,, | Performed by: INTERNAL MEDICINE

## 2019-05-23 PROCEDURE — 3079F DIAST BP 80-89 MM HG: CPT | Mod: CPTII,S$GLB,, | Performed by: INTERNAL MEDICINE

## 2019-05-23 PROCEDURE — 99214 PR OFFICE/OUTPT VISIT, EST, LEVL IV, 30-39 MIN: ICD-10-PCS | Mod: S$GLB,,, | Performed by: INTERNAL MEDICINE

## 2019-05-23 PROCEDURE — 3008F PR BODY MASS INDEX (BMI) DOCUMENTED: ICD-10-PCS | Mod: CPTII,S$GLB,, | Performed by: INTERNAL MEDICINE

## 2019-05-23 PROCEDURE — 99214 OFFICE O/P EST MOD 30 MIN: CPT | Mod: S$GLB,,, | Performed by: INTERNAL MEDICINE

## 2019-05-23 PROCEDURE — 3079F PR MOST RECENT DIASTOLIC BLOOD PRESSURE 80-89 MM HG: ICD-10-PCS | Mod: CPTII,S$GLB,, | Performed by: INTERNAL MEDICINE

## 2019-05-23 PROCEDURE — 99999 PR PBB SHADOW E&M-EST. PATIENT-LVL III: ICD-10-PCS | Mod: PBBFAC,,, | Performed by: INTERNAL MEDICINE

## 2019-05-23 PROCEDURE — 3074F SYST BP LT 130 MM HG: CPT | Mod: CPTII,S$GLB,, | Performed by: INTERNAL MEDICINE

## 2019-05-23 NOTE — PROGRESS NOTES
Subjective:    Patient ID:  Sara Grey is a 58 y.o. female who presents for follow-up of Watchman device (6 month follow up )      58 yoF HTN, DM, AF, CVA, COPD, tob abuse here for LAAO consideration. She has history of AF dating back several years. She was placed on pradaxa for CVA prophylaxis. She suffered a CVA 6/17 on pradaxa leading to some speech impairment that is mostly improved. She was switched to eliquis but has had recurrent  bleeding. Her EF is normal at 55% but she has diastolic dynsfunction. She has moderate COPD, LUCRECIA and morbid obesity. No history of PVD.    10/18: Watchman device 8/31/18 successfully deployed. No procedural related complications. Repeat SADIE 10/15/18 showed 2 mm jet, apixaban stopped. Now on DAPT.     Interval history: No bleeding issues. She has reached 6m post implant. Remains on DAPT.     EF 55% 6/25/17    Past Medical History:  No date: Atrial fibrillation  No date: CHF (congestive heart failure)  No date: COPD (chronic obstructive pulmonary disease)  No date: CVA (cerebral vascular accident)  No date: Diabetes mellitus  No date: Hyperlipidemia  No date: Hypertension    Past Surgical History:  8/31/2018: CLOSURE, LEFT ATRIAL APPENDAGE, USING DEVICE; N/A      Comment:  Performed by Ovi Ferraro MD at [facility] CATH LAB  10/15/2018: ECHOCARDIOGRAM,TRANSESOPHAGEAL; N/A      Comment:  Performed by [facility] Diagnostic Provider at [facility] CATH LAB  8/31/2018: ECHOCARDIOGRAM,TRANSESOPHAGEAL; N/A      Comment:  Performed by Ovi Ferraro MD at [facility] CATH LAB  8/16/2018: ECHOCARDIOGRAM,TRANSESOPHAGEAL; N/A      Comment:  Performed by [facility] Diagnostic Provider at [facility] CATH LAB  No date: HERNIA REPAIR  6/8/2018: TRANSESOPHAGEAL ECHOCARDIOGRAM (SADIE); N/A      Comment:  Performed by [facility] Diagnostic Provider at [facility] CATH LAB    Social History    Socioeconomic History      Marital status: Single      Spouse name: Not on file      Number of children: Not on file      Years of education:  Not on file      Highest education level: Not on file    Occupational History      Not on file    Social Needs      Financial resource strain: Not on file      Food insecurity:        Worry: Not on file        Inability: Not on file      Transportation needs:        Medical: Not on file        Non-medical: Not on file    Tobacco Use      Smoking status: Former Smoker        Quit date: 2018        Years since quittin.0      Smokeless tobacco: Never Used    Substance and Sexual Activity      Alcohol use: Yes        Comment: occassionally      Drug use: No      Sexual activity: Not on file    Lifestyle      Physical activity:        Days per week: Not on file        Minutes per session: Not on file      Stress: Not on file    Relationships      Social connections:        Talks on phone: Not on file        Gets together: Not on file        Attends Religion service: Not on file        Active member of club or organization: Not on file        Attends meetings of clubs or organizations: Not on file        Relationship status: Not on file    Other Topics      Concerns:        Not on file    Social History Narrative      Not on file      History reviewed.  No pertinent family history.        Review of Systems   Constitution: Negative.   HENT: Negative.    Eyes: Negative.    Cardiovascular: Positive for dyspnea on exertion. Negative for chest pain, leg swelling, near-syncope, palpitations and syncope.   Respiratory: Positive for shortness of breath.    Endocrine: Negative.    Hematologic/Lymphatic: Negative.    Skin: Negative.    Musculoskeletal: Negative.    Gastrointestinal: Positive for bloating.   Genitourinary: Positive for non-menstrual bleeding.   Neurological: Negative.  Negative for dizziness and light-headedness.   Psychiatric/Behavioral: Negative.    Allergic/Immunologic: Negative.         Objective:    Physical Exam   Constitutional: She is oriented to person, place, and time. She appears well-developed  and well-nourished. No distress.   Morbidly obese   HENT:   Head: Normocephalic and atraumatic.   Eyes: Pupils are equal, round, and reactive to light. EOM are normal.   Neck: Normal range of motion. No JVD present. No thyromegaly present.   Cardiovascular: Normal rate, S1 normal, S2 normal and normal heart sounds. An irregularly irregular rhythm present. PMI is not displaced. Exam reveals no gallop and no friction rub.   No murmur heard.  Pulmonary/Chest: Effort normal and breath sounds normal. No respiratory distress. She has no wheezes. She has no rales.   Abdominal: Soft. Bowel sounds are normal. She exhibits mass (ventral hernia). She exhibits no distension. There is no tenderness. There is no rebound and no guarding.   Musculoskeletal: Normal range of motion. She exhibits no edema or tenderness.   Neurological: She is alert and oriented to person, place, and time. No cranial nerve deficit.   Skin: Skin is warm and dry. No rash noted. No erythema.   Psychiatric: She has a normal mood and affect. Her behavior is normal. Judgment and thought content normal.   Vitals reviewed.        Assessment:       1. Persistent atrial fibrillation    2. At high risk for  bleeding    3. Cerebrovascular accident (CVA) due to embolism of cerebral artery    4. Presence of Watchman left atrial appendage closure device         Plan:       58 yoF s/p LAAO via Watchman. She has reached 6m+ post implant. Can stop DAPT. RTC as needed

## 2021-01-28 NOTE — PROGRESS NOTES
TRANSESOPHAGEAL ECHO (SADIE) INSTRUCTIONS    You have been scheduled for a procedure in the echo lab on 10/15/2018.      Please report to the Cardiology Waiting Area on the Third floor of the hospital and check in at 11am.   You will then be taken to the SSCU (Short Stay Cardiac Unit) and prepared for your procedure. Please be aware that this is not the time of your procedure but the time you are to arrive. The procedures are scheduled on an hourly basis; however, emergency cases take precedence over all other cases.      You may not have anything to eat or drink after midnight the night before your test.     Medications:  · Take 1/2 dose insulin the evening prior  · HOLD Insulin and Metformin on day of procedure  · You may take your other regular morning medications with water.     The procedure will take 1-2 hours to perform. After the procedure, you will return to SSCU on the third floor of the hospital. Your family may remain in the room with you during this time.      You will be discharged home that same afternoon. You must have someone to drive you home.  Your doctor will determine, based on your progress, the time of your discharge. The results of your procedure will be discussed with you before you are discharged.      You will be contacted by the echo department prior to your procedure for a  pre-procedure questionnaire.     Any need to reschedule or cancel procedures, or any questions regarding your procedures should be addressed directly with the Arrhythmia Department Nurses at the following phone number: [phone].    Directions to Cardiology Waiting Area:  If you park in the Parking Garage:  Take elevators to the 2nd floor  Walk up ramp and turn right by Gold Elevators  Take elevator to the 3rd floor  Upon exiting the elevator, turn away from the clinic areas  Walk long ibanez around to front of hospital to area with windows overlooking [facility]  Check in at Reception Desk  OR  If family is  dropping you off:  Have them drop you off at the front of the Hospital  (Near the ER, where all the flags are hung).  Take the E elevators to the 3rd floor.  Check in at the Reception Desk in the waiting room.         Prednisone Counseling:  I discussed with the patient the risks of prolonged use of prednisone including but not limited to weight gain, insomnia, osteoporosis, mood changes, diabetes, susceptibility to infection, glaucoma and high blood pressure.  In cases where prednisone use is prolonged, patients should be monitored with blood pressure checks, serum glucose levels and an eye exam.  Additionally, the patient may need to be placed on GI prophylaxis, PCP prophylaxis, and calcium and vitamin D supplementation and/or a bisphosphonate.  The patient verbalized understanding of the proper use and the possible adverse effects of prednisone.  All of the patient's questions and concerns were addressed.

## 2022-11-02 NOTE — BRIEF OP NOTE
S/p LAAO with watchman device:  Tolerated procedure well. No acute complication noted.  Post op care per protocol.  Will monitor in recovery on tele overnight  Extubation per anesthesia   Bed flat for 4 hrs - hemostasis with sutures and manual pressure   Intra op/post op SADIE performed - well seated device  Resume OAC - elequis 6 hrs after hemostasis.   6 wks of elequis post watchman and then repeat SADIE - if it looks good then switch to DAPT for 6 mt.       
No